# Patient Record
Sex: FEMALE | Race: BLACK OR AFRICAN AMERICAN | NOT HISPANIC OR LATINO
[De-identification: names, ages, dates, MRNs, and addresses within clinical notes are randomized per-mention and may not be internally consistent; named-entity substitution may affect disease eponyms.]

---

## 2017-01-19 ENCOUNTER — APPOINTMENT (OUTPATIENT)
Dept: PULMONOLOGY | Facility: CLINIC | Age: 57
End: 2017-01-19

## 2017-01-19 VITALS
HEART RATE: 109 BPM | BODY MASS INDEX: 32.25 KG/M2 | OXYGEN SATURATION: 96 % | WEIGHT: 160 LBS | HEIGHT: 59 IN | SYSTOLIC BLOOD PRESSURE: 130 MMHG | TEMPERATURE: 97.4 F | DIASTOLIC BLOOD PRESSURE: 80 MMHG

## 2017-01-19 DIAGNOSIS — Z87.891 PERSONAL HISTORY OF NICOTINE DEPENDENCE: ICD-10-CM

## 2017-01-19 DIAGNOSIS — F17.200 NICOTINE DEPENDENCE, UNSPECIFIED, UNCOMPLICATED: ICD-10-CM

## 2017-01-19 DIAGNOSIS — F17.210 NICOTINE DEPENDENCE, CIGARETTES, UNCOMPLICATED: ICD-10-CM

## 2017-01-19 DIAGNOSIS — R05 COUGH: ICD-10-CM

## 2017-01-19 DIAGNOSIS — E04.2 NONTOXIC MULTINODULAR GOITER: ICD-10-CM

## 2017-01-20 PROBLEM — R05 CHRONIC COUGH: Status: ACTIVE | Noted: 2017-01-20

## 2017-01-21 PROBLEM — E04.2 MULTIPLE THYROID NODULES: Status: RESOLVED | Noted: 2017-01-21 | Resolved: 2017-01-21

## 2017-01-21 PROBLEM — Z87.891 FORMER SMOKER: Status: ACTIVE | Noted: 2017-01-21

## 2017-01-21 PROBLEM — F17.200 HEAVY TOBACCO SMOKER: Status: ACTIVE | Noted: 2017-01-21

## 2017-04-26 ENCOUNTER — APPOINTMENT (OUTPATIENT)
Dept: ENDOCRINOLOGY | Facility: CLINIC | Age: 57
End: 2017-04-26

## 2018-03-15 ENCOUNTER — APPOINTMENT (OUTPATIENT)
Dept: ENDOCRINOLOGY | Facility: CLINIC | Age: 58
End: 2018-03-15

## 2018-04-24 ENCOUNTER — APPOINTMENT (OUTPATIENT)
Dept: ENDOCRINOLOGY | Facility: CLINIC | Age: 58
End: 2018-04-24
Payer: MEDICAID

## 2018-04-24 VITALS — HEART RATE: 100 BPM | DIASTOLIC BLOOD PRESSURE: 70 MMHG | SYSTOLIC BLOOD PRESSURE: 120 MMHG

## 2018-04-24 VITALS
WEIGHT: 177 LBS | DIASTOLIC BLOOD PRESSURE: 77 MMHG | HEIGHT: 59 IN | SYSTOLIC BLOOD PRESSURE: 120 MMHG | HEART RATE: 100 BPM | BODY MASS INDEX: 35.68 KG/M2

## 2018-04-24 VITALS — WEIGHT: 177 LBS | HEIGHT: 59 IN | BODY MASS INDEX: 35.68 KG/M2

## 2018-04-24 DIAGNOSIS — Z87.891 PERSONAL HISTORY OF NICOTINE DEPENDENCE: ICD-10-CM

## 2018-04-24 PROCEDURE — 99406 BEHAV CHNG SMOKING 3-10 MIN: CPT

## 2018-04-24 PROCEDURE — 99215 OFFICE O/P EST HI 40 MIN: CPT | Mod: 25

## 2018-04-24 RX ORDER — IBUPROFEN 800 MG/1
800 TABLET, FILM COATED ORAL
Refills: 0 | Status: ACTIVE | COMMUNITY

## 2018-04-24 RX ORDER — FAMOTIDINE 20 MG/1
20 TABLET, FILM COATED ORAL
Refills: 0 | Status: ACTIVE | COMMUNITY

## 2018-05-04 LAB
25(OH)D3 SERPL-MCNC: 20.4 NG/ML
ACTH SER-ACNC: 11 PG/ML
ALBUMIN SERPL ELPH-MCNC: 4.5 G/DL
ALP BLD-CCNC: 126 U/L
ALT SERPL-CCNC: 39 U/L
ANION GAP SERPL CALC-SCNC: 13 MMOL/L
AST SERPL-CCNC: 30 U/L
BILIRUB SERPL-MCNC: 0.2 MG/DL
BUN SERPL-MCNC: 10 MG/DL
CALCIUM SERPL-MCNC: 10.2 MG/DL
CHLORIDE SERPL-SCNC: 99 MMOL/L
CHOLEST SERPL-MCNC: 268 MG/DL
CHOLEST/HDLC SERPL: 5.4 RATIO
CO2 SERPL-SCNC: 30 MMOL/L
CORTIS SERPL-MCNC: 5.2 UG/DL
CREAT SERPL-MCNC: 1.04 MG/DL
CREAT SPEC-SCNC: 154 MG/DL
FOLATE SERPL-MCNC: 16.1 NG/ML
GLUCOSE SERPL-MCNC: 104 MG/DL
HBA1C MFR BLD HPLC: 7.7 %
HDLC SERPL-MCNC: 50 MG/DL
IGF-1 INTERP: NORMAL
IGF-I BLD-MCNC: 189 NG/ML
LDLC SERPL CALC-MCNC: 178 MG/DL
MICROALBUMIN 24H UR DL<=1MG/L-MCNC: 2.5 MG/DL
MICROALBUMIN/CREAT 24H UR-RTO: 16 MG/G
POTASSIUM SERPL-SCNC: 4.6 MMOL/L
PROT SERPL-MCNC: 8 G/DL
SODIUM SERPL-SCNC: 142 MMOL/L
T3 SERPL-MCNC: 92 NG/DL
T4 FREE SERPL-MCNC: 1 NG/DL
TRIGL SERPL-MCNC: 201 MG/DL
TSH SERPL-ACNC: 0.67 UIU/ML
VIT B12 SERPL-MCNC: 718 PG/ML

## 2018-05-08 ENCOUNTER — APPOINTMENT (OUTPATIENT)
Dept: ENDOCRINOLOGY | Facility: CLINIC | Age: 58
End: 2018-05-08

## 2018-06-20 ENCOUNTER — APPOINTMENT (OUTPATIENT)
Dept: ENDOCRINOLOGY | Facility: CLINIC | Age: 58
End: 2018-06-20
Payer: MEDICAID

## 2018-06-20 ENCOUNTER — APPOINTMENT (OUTPATIENT)
Dept: ENDOCRINOLOGY | Facility: CLINIC | Age: 58
End: 2018-06-20

## 2018-06-20 VITALS
WEIGHT: 171.6 LBS | SYSTOLIC BLOOD PRESSURE: 140 MMHG | HEIGHT: 59 IN | HEART RATE: 116 BPM | BODY MASS INDEX: 34.6 KG/M2 | DIASTOLIC BLOOD PRESSURE: 83 MMHG

## 2018-06-20 DIAGNOSIS — E78.5 HYPERLIPIDEMIA, UNSPECIFIED: ICD-10-CM

## 2018-06-20 DIAGNOSIS — E66.9 OBESITY, UNSPECIFIED: ICD-10-CM

## 2018-06-20 DIAGNOSIS — E11.9 TYPE 2 DIABETES MELLITUS W/OUT COMPLICATIONS: ICD-10-CM

## 2018-06-20 PROCEDURE — 99214 OFFICE O/P EST MOD 30 MIN: CPT

## 2018-08-22 ENCOUNTER — APPOINTMENT (OUTPATIENT)
Dept: ENDOCRINOLOGY | Facility: CLINIC | Age: 58
End: 2018-08-22
Payer: MEDICAID

## 2018-08-22 VITALS
BODY MASS INDEX: 33.87 KG/M2 | DIASTOLIC BLOOD PRESSURE: 106 MMHG | WEIGHT: 168 LBS | SYSTOLIC BLOOD PRESSURE: 143 MMHG | HEIGHT: 59 IN | HEART RATE: 96 BPM

## 2018-08-22 LAB
GLUCOSE BLDC GLUCOMTR-MCNC: 76
HBA1C MFR BLD HPLC: 7.4

## 2018-08-22 PROCEDURE — 99214 OFFICE O/P EST MOD 30 MIN: CPT

## 2018-08-22 RX ORDER — LOSARTAN POTASSIUM 25 MG/1
25 TABLET, FILM COATED ORAL DAILY
Qty: 90 | Refills: 2 | Status: ACTIVE | COMMUNITY
Start: 2018-08-22 | End: 1900-01-01

## 2018-08-22 RX ORDER — EMPAGLIFLOZIN 25 MG/1
25 TABLET, FILM COATED ORAL
Qty: 90 | Refills: 3 | Status: ACTIVE | COMMUNITY
Start: 2018-04-24 | End: 1900-01-01

## 2018-08-22 RX ORDER — METFORMIN ER 500 MG 500 MG/1
500 TABLET ORAL DAILY
Qty: 180 | Refills: 3 | Status: ACTIVE | COMMUNITY
Start: 2018-04-24 | End: 1900-01-01

## 2018-08-22 RX ORDER — VALSARTAN 40 MG/1
TABLET ORAL
Refills: 0 | Status: COMPLETED | COMMUNITY
End: 2018-08-22

## 2018-12-19 ENCOUNTER — APPOINTMENT (OUTPATIENT)
Dept: ENDOCRINOLOGY | Facility: CLINIC | Age: 58
End: 2018-12-19

## 2019-02-11 ENCOUNTER — APPOINTMENT (OUTPATIENT)
Dept: ENDOCRINOLOGY | Facility: CLINIC | Age: 59
End: 2019-02-11

## 2019-04-04 ENCOUNTER — APPOINTMENT (OUTPATIENT)
Dept: ENDOCRINOLOGY | Facility: CLINIC | Age: 59
End: 2019-04-04

## 2019-04-05 ENCOUNTER — APPOINTMENT (OUTPATIENT)
Dept: ENDOCRINOLOGY | Facility: CLINIC | Age: 59
End: 2019-04-05

## 2020-03-02 ENCOUNTER — FORM ENCOUNTER (OUTPATIENT)
Age: 60
End: 2020-03-02

## 2020-03-03 ENCOUNTER — APPOINTMENT (OUTPATIENT)
Dept: PULMONOLOGY | Facility: CLINIC | Age: 60
End: 2020-03-03
Payer: MEDICAID

## 2020-03-03 ENCOUNTER — OUTPATIENT (OUTPATIENT)
Dept: OUTPATIENT SERVICES | Facility: HOSPITAL | Age: 60
LOS: 1 days | End: 2020-03-03
Payer: MEDICAID

## 2020-03-03 ENCOUNTER — APPOINTMENT (OUTPATIENT)
Dept: CT IMAGING | Facility: HOSPITAL | Age: 60
End: 2020-03-03
Payer: MEDICAID

## 2020-03-03 VITALS
HEART RATE: 116 BPM | WEIGHT: 160 LBS | BODY MASS INDEX: 32.25 KG/M2 | OXYGEN SATURATION: 95 % | HEIGHT: 59 IN | DIASTOLIC BLOOD PRESSURE: 84 MMHG | SYSTOLIC BLOOD PRESSURE: 132 MMHG | TEMPERATURE: 98.1 F

## 2020-03-03 PROCEDURE — G0297: CPT

## 2020-03-03 PROCEDURE — 99406 BEHAV CHNG SMOKING 3-10 MIN: CPT | Mod: 25

## 2020-03-03 PROCEDURE — G0296 VISIT TO DETERM LDCT ELIG: CPT

## 2020-03-03 PROCEDURE — G0297: CPT | Mod: 26

## 2020-03-03 NOTE — PLAN
[Smoking Cessation Guidance Provided] : Smoking cessation guidance was provided to patient [Smoking Cessation Pamphlet Given] : smoking cessation pamphlet given to patient  [[facility]] : referred to [facility] [phone] [Smoking Cessation] : smoking cessation [FreeTextEntry1] : Plan:\par -Low Dose CT chest for lung cancer screening\par -Follow up with patient and her referring provider after her LDCT results have been reviewed by the multi-disciplinary clinical team\par -Encouraged smoking cessation\par -_encouraged to try continued decrease cig/day\par -[place] Sm[place] Quit[place] literature shared with patient and Staying Smoke Free brochure\par -Referred to CTC\par \par \par Engaged in shared decision making with Ms. NISHANT GARCIA . Answered all questions. She verbalized understanding and agreement. She knows to call back with any questions or concerns

## 2020-03-03 NOTE — HISTORY OF PRESENT ILLNESS
[_____ pack-years] : [unfilled] pack-years [Current] : current smoker [TextBox_13] : Referred by Dr. Mcdermott\par \par Ms. NISHANT GARCIA  is a 59 year old woman with a history of DM, high cholesterol\par \par She  was seen in the office today for review of eligibility for, as well as, discussion of Low-Dose CT lung cancer screening program. Reviewed and confirmed that the patient meets screening eligibility criteria:\par -Age: 59 year \par Smoking status:\par -Current smoker of 5 cig/day\par -Number of pack(s) per day: 1\par -Number of year smoked:  44\par -Number of pack years smokin\par \par Has tried NRT patch, thinks she tried Chantix "but my mind was not made up" Has only "slowed down because I was sick" Has been smoking less since  in hospital since he was the one who bought the cigarettes\par Does not feel she is quite ready to quit\par \par Ms. GARCIA denies any signs or symptoms of lung cancer including new cough, change in cough, hemoptysis and unintentional weight loss.  \par \par Ms. GARCIA denies any personal history of lung cancer. No lung cancer in a 1st degree relative. Denies any history of lung disease. Denies any history of occupational exposures.  [TextBox_6] : 1 [TextBox_8] : 44

## 2020-03-03 NOTE — ASSESSMENT
[Discussed Risks and Advised to Quit Smoking] : Discussed risks and advised to quit smoking [Discussed Cessation Medication] : cessation medication was discussed [Discussed Cessation Strategies] : cessation strategies were discussed [Smoking Cessation Counseling Given (more than 3 min less than10 min) and Resources Provided] : Smoking cessation counseling given (more than 3 min less than 10 min) and resources provided [Not Ready] : Patient is not ready for cessation intervention [Pre-contemplation] : Pre-contemplation: The patient is not thinking about quitting smoking in the next 6 months [de-identified] : Acknowledged how difficult it is to quit smoking. Advised that quitting smoking is the most important thing a person can do for their health. Discussed strategies to deal with cravings. Suggested keeping a log of cigarettes and their triggers in an effort to identify triggers and break routines/habits. Suggested waiting 15 minutes after initial craving. Suggested making a rule that she can't smoke in her home

## 2020-06-14 ENCOUNTER — RX RENEWAL (OUTPATIENT)
Age: 60
End: 2020-06-14

## 2021-02-09 ENCOUNTER — EMERGENCY (EMERGENCY)
Facility: HOSPITAL | Age: 61
LOS: 1 days | Discharge: HOME | End: 2021-02-09
Attending: EMERGENCY MEDICINE | Admitting: EMERGENCY MEDICINE
Payer: MEDICAID

## 2021-02-09 VITALS
TEMPERATURE: 99 F | HEART RATE: 107 BPM | DIASTOLIC BLOOD PRESSURE: 62 MMHG | SYSTOLIC BLOOD PRESSURE: 101 MMHG | RESPIRATION RATE: 18 BRPM | OXYGEN SATURATION: 62 %

## 2021-02-09 VITALS
HEART RATE: 97 BPM | RESPIRATION RATE: 18 BRPM | WEIGHT: 149.91 LBS | OXYGEN SATURATION: 97 % | DIASTOLIC BLOOD PRESSURE: 79 MMHG | SYSTOLIC BLOOD PRESSURE: 114 MMHG | TEMPERATURE: 98 F | HEIGHT: 59 IN
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
## 2021-02-09 DIAGNOSIS — S09.90XA UNSPECIFIED INJURY OF HEAD, INITIAL ENCOUNTER: ICD-10-CM

## 2021-02-09 PROCEDURE — 73030 X-RAY EXAM OF SHOULDER: CPT

## 2021-02-09 PROCEDURE — 72100 X-RAY EXAM L-S SPINE 2/3 VWS: CPT | Mod: 26

## 2021-02-09 PROCEDURE — 70450 CT HEAD/BRAIN W/O DYE: CPT | Mod: 26,MA

## 2021-02-09 PROCEDURE — 99284 EMERGENCY DEPT VISIT MOD MDM: CPT

## 2021-02-09 PROCEDURE — 71046 X-RAY EXAM CHEST 2 VIEWS: CPT | Mod: 26

## 2021-02-09 PROCEDURE — 71046 X-RAY EXAM CHEST 2 VIEWS: CPT

## 2021-02-09 PROCEDURE — 70450 CT HEAD/BRAIN W/O DYE: CPT

## 2021-02-09 PROCEDURE — 72100 X-RAY EXAM L-S SPINE 2/3 VWS: CPT

## 2021-02-09 PROCEDURE — 99285 EMERGENCY DEPT VISIT HI MDM: CPT

## 2021-02-09 PROCEDURE — 72125 CT NECK SPINE W/O DYE: CPT | Mod: 26,MA

## 2021-02-09 PROCEDURE — 72125 CT NECK SPINE W/O DYE: CPT

## 2021-02-09 PROCEDURE — 73030 X-RAY EXAM OF SHOULDER: CPT | Mod: 26

## 2021-02-09 RX ORDER — IBUPROFEN 200 MG
1 TABLET ORAL
Qty: 12 | Refills: 0
Start: 2021-02-09

## 2021-02-09 RX ORDER — CYCLOBENZAPRINE HYDROCHLORIDE 10 MG/1
1 TABLET, FILM COATED ORAL
Qty: 12 | Refills: 0
Start: 2021-02-09

## 2021-02-09 RX ORDER — IBUPROFEN 200 MG
600 TABLET ORAL ONCE
Refills: 0 | Status: COMPLETED | OUTPATIENT
Start: 2021-02-09 | End: 2021-02-09

## 2021-02-09 RX ORDER — CYCLOBENZAPRINE HYDROCHLORIDE 10 MG/1
10 TABLET, FILM COATED ORAL ONCE
Refills: 0 | Status: COMPLETED | OUTPATIENT
Start: 2021-02-09 | End: 2021-02-09

## 2021-02-09 RX ADMIN — CYCLOBENZAPRINE HYDROCHLORIDE 10 MILLIGRAM(S): 10 TABLET, FILM COATED ORAL at 12:59

## 2021-02-09 RX ADMIN — Medication 600 MILLIGRAM(S): at 12:59

## 2021-02-09 NOTE — ED PROVIDER NOTE - PATIENT PORTAL LINK FT
You can access the FollowMyHealth Patient Portal offered by [facility] by registering at the following website: [email or web address]. By joining [employer]’s FollowMyHealth portal, you will also be able to view your health information using other applications (apps) compatible with our system.

## 2021-02-09 NOTE — ED PROVIDER NOTE - CONSTITUTIONAL, MLM
Well appearing, awake, alert, oriented to person, place, time/situation and in no apparent distress. normal... Well appearing, awake, alert, oriented and in no apparent distress.

## 2021-02-09 NOTE — ED PROVIDER NOTE - MUSCULOSKELETAL, MLM
Ambulates in the ER with no problems. No midline tenderness to palpation, neuro intact, full range of motion, mild tenderness to palpation to the left shoulder Ambulates in the ER with no problems. No midline spine tenderness to palpation, neuro intact, full range of motion, mild tenderness to palpation to the left shoulder with FROM

## 2021-02-09 NOTE — ED PROVIDER NOTE - CLINICAL SUMMARY MEDICAL DECISION MAKING FREE TEXT BOX
60F presents for an accidental slip and fall sustaining head pain, back pain and shoulder pain. Plan: Will order head CT, C-spine imaging, L-Spine imaging, CXR, L shoulder. Will administer flexeril and ibuprofen. 61 yo F presents for an accidental slip and fall down 4 steps sustaining head pain, back pain and shoulder pain. Plan: Will order head CT, C-spine imaging, L-Spine imaging, CXR, L shoulder XR. Will administer flexeril and ibuprofen.  ED course: VS noted. Neuro exam is normal. CT head and C-spine with NAD. XRs L-spine/ CXR and left shoulder with NAD. Pt feeling better post pain meds. Rxs given for Ibuprofen and Flexeril. To f/up outpt. Return precautions given.

## 2021-02-09 NOTE — ED ADULT NURSE NOTE - OBJECTIVE STATEMENT
61 y/o female BIBEMS for evaluation of bakc pain and headache s/p fall today. Pt reports she fell three steps and hit "My head and back" Pt reports LOC, endorses lightheadedness.

## 2021-02-09 NOTE — ED ADULT TRIAGE NOTE - OTHER COMPLAINTS
Reports fall down 4 steps. Patient reports pain down the back of her body. Positive head injury, denies use of blood thinners. Unknown if patient had loss of consciousness.

## 2021-02-09 NOTE — ED PROVIDER NOTE - NSFOLLOWUPINSTRUCTIONS_ED_ALL_ED_FT
Please follow up with your primary care doctor. Return to the ER if you develop any concerning symptoms.     Back Pain    Back pain is very common in adults. The cause of back pain is rarely dangerous and the pain often gets better over time. The cause of your back pain may not be known and may include strain of muscles or ligaments, degeneration of the spinal disks, or arthritis. Occasionally the pain may radiate down your leg(s). Over-the-counter medicines to reduce pain and inflammation are often the most helpful. Stretching and remaining active frequently helps the healing process.     SEEK IMMEDIATE MEDICAL CARE IF YOU HAVE ANY OF THE FOLLOWING SYMPTOMS: bowel or bladder control problems, unusual weakness or numbness in your arms or legs, nausea or vomiting, abdominal pain, fever, dizziness/lightheadedness.      Closed Head Injury    A closed head injury is an injury to your head that may or may not involve a traumatic brain injury (TBI). Symptoms of TBI can be short or long lasting and include headache, dizziness, interference with memory or speech, fatigue, confusion, changes in sleep, mood changes, nausea, depression/anxiety, and dulling of senses. Make sure to obtain proper rest which includes getting plenty of sleep, avoiding excessive visual stimulation, and avoiding activities that may cause physical or mental stress. Avoid any situation where there is potential for another head injury, including sports.    SEEK IMMEDIATE MEDICAL CARE IF YOU HAVE ANY OF THE FOLLOWING SYMPTOMS: unusual drowsiness, vomiting, severe dizziness, seizures, lightheadedness, muscular weakness, different pupil sizes, visual changes, or clear or bloody discharge from your ears or nose.

## 2021-02-09 NOTE — ED PROVIDER NOTE - OBJECTIVE STATEMENT
60F with a pmhx of NIDDM, HTN, HLD, crack cocaine abuse and herniated disk in neck and back [no surgery] presents to the ED  for head injury, left shoulder pain, neck pain and back pain secondary to a slip-and-fall. Patient fell down 4 steps as she was coming out of rehab for addiction due to slippery and wet floors. Patient fell and hit her back, left shoulder, and head on the steps. Patient Denies any LOC, CP, SOB, focal numbness, weakness or any other acute medical complaints at this time. Patient denies any anticoagulant use. Patient ambulated after the fall, as well as in the ED. 61 yo F with a pmhx of NIDDM, HTN, HLD, crack cocaine abuse and herniated disk in neck and back [no surgery] presents to the ED  for head injury, left shoulder pain, neck pain and back pain secondary to a slip-and-fall. Patient fell down 4 steps as she was coming out of rehab for cocaine addiction due to slippery and wet floors. Patient fell and hit her back, left shoulder, and head on the steps. Patient Denies any LOC, CP, SOB, focal numbness, weakness or any other acute medical complaints at this time. Patient denies any anticoagulant use. Patient ambulated after the fall, as well as in the ED.

## 2021-02-09 NOTE — ED PROVIDER NOTE - ENMT, MLM
HEAD: no swelling, no lac, no hematoma. Airway is patent, Nasal mucosa clear. Mouth with normal mucosa. Throat has no vesicles, no oropharyngeal exudates and uvula is midline.

## 2021-02-09 NOTE — ED PROVIDER NOTE - NSFOLLOWUPCLINICS_GEN_ALL_ED_FT
[facility]  Family Medicine  [address]  [place], [place] [address]  Phone: [phone]  Fax:   Follow Up Time: 4-6 Days

## 2021-02-09 NOTE — ED PROVIDER NOTE - NEUROLOGICAL, MLM
Alert and oriented, no focal deficits, no motor or sensory deficits. Alert and oriented, no focal deficits, no motor deficits. Gait is normal.

## 2021-03-12 PROBLEM — F19.10 OTHER PSYCHOACTIVE SUBSTANCE ABUSE, UNCOMPLICATED: Chronic | Status: ACTIVE | Noted: 2021-02-13

## 2021-03-12 PROBLEM — I10 ESSENTIAL (PRIMARY) HYPERTENSION: Chronic | Status: ACTIVE | Noted: 2021-02-13

## 2021-03-12 PROBLEM — E78.5 HYPERLIPIDEMIA, UNSPECIFIED: Chronic | Status: ACTIVE | Noted: 2021-02-13

## 2021-03-12 PROBLEM — E11.9 TYPE 2 DIABETES MELLITUS WITHOUT COMPLICATIONS: Chronic | Status: ACTIVE | Noted: 2021-02-13

## 2021-03-19 ENCOUNTER — APPOINTMENT (OUTPATIENT)
Dept: PULMONOLOGY | Facility: CLINIC | Age: 61
End: 2021-03-19
Payer: MEDICAID

## 2021-03-19 VITALS — HEIGHT: 59 IN | WEIGHT: 145 LBS | BODY MASS INDEX: 29.23 KG/M2

## 2021-03-19 DIAGNOSIS — F17.210 NICOTINE DEPENDENCE, CIGARETTES, UNCOMPLICATED: ICD-10-CM

## 2021-03-19 PROCEDURE — G0296 VISIT TO DETERM LDCT ELIG: CPT

## 2021-03-19 PROCEDURE — 99072 ADDL SUPL MATRL&STAF TM PHE: CPT

## 2021-03-19 NOTE — HISTORY OF PRESENT ILLNESS
[Current] : current smoker [_____ pack-years] : [unfilled] pack-years [TextBox_13] : Referred by Dr. Corea\par \par Ms. NISHANT GARCIA is a 60 year old woman with a history of DM, high cholesterol\par \par Over the phone today we reviewed eligibility for, as well as, discussion of Low-Dose CT lung cancer screening program. Reviewed and confirmed that the patient meets screening eligibility criteria:\par -Age: 60 year \par Smoking status:\par -Current smoker of 3 cig/day\par -Number of pack(s) per day: 1\par -Number of year smoked: 45\par -Number of pack years smokin\par \par Has tried NRT patch, thinks she tried Chantix "but my mind was not made up" Has slowed down \par Does not feel she is quite ready to quit. Declines CTC because does not want to waste their time\par \par Ms. GARCIA denies any signs or symptoms of lung cancer including new cough, change in cough, hemoptysis and unintentional weight loss. \par \par Ms. GARCIA denies any personal history of lung cancer. No lung cancer in a 1st degree relative. Denies any history of lung disease. Denies any history of occupational exposures. \par \par  [TextBox_6] : 1 [TextBox_8] : 44

## 2021-03-19 NOTE — REASON FOR VISIT
[Home] : at home, [unfilled] , at the time of the visit. [Medical Office: ([facility])___] : at the medical office located in  [Verbal consent obtained from patient] : the patient, [unfilled] [Annual Follow-Up] : an annual follow-up visit [Review of Eligibility] : review of eligibility [Low-Dose CT Screening Discussion] : low-dose CT lung cancer screening discussion

## 2021-03-19 NOTE — ASSESSMENT
[Discussed Risks and Advised to Quit Smoking] : Discussed risks and advised to quit smoking [Not Ready] : Patient is not ready for cessation intervention [Pre-contemplation] : Pre-contemplation: The patient is not thinking about quitting smoking in the next 6 months [Patient refused treatment] : Patient refused treatment

## 2021-03-19 NOTE — PLAN
[Smoking Cessation] : smoking cessation [FreeTextEntry1] : Plan:\par -Low Dose CT chest for lung cancer screening\par -Follow up with patient and her referring provider after her LDCT results have been reviewed by the multi-disciplinary clinical team.\par -Encouraged smoking cessation\par -Referral to CTC declined\par \par Engaged in shared decision making with Ms. NISHANT JOSE . Answered all questions. She verbalized understanding and agreement. She knows to call back with any questions or concerns

## 2021-03-19 NOTE — DATA REVIEWED
[Lung Cancer Screening] : Patient underwent lung cancer screening [2] : 2 [TextBox_12] : 3/20 [TextBox_52] : 1

## 2021-04-21 ENCOUNTER — APPOINTMENT (OUTPATIENT)
Dept: CT IMAGING | Facility: HOSPITAL | Age: 61
End: 2021-04-21

## 2021-04-23 ENCOUNTER — RESULT REVIEW (OUTPATIENT)
Age: 61
End: 2021-04-23

## 2021-04-23 ENCOUNTER — APPOINTMENT (OUTPATIENT)
Dept: CT IMAGING | Facility: HOSPITAL | Age: 61
End: 2021-04-23

## 2021-04-23 ENCOUNTER — OUTPATIENT (OUTPATIENT)
Dept: OUTPATIENT SERVICES | Facility: HOSPITAL | Age: 61
LOS: 1 days | End: 2021-04-23
Payer: MEDICAID

## 2021-04-23 PROCEDURE — 71271 CT THORAX LUNG CANCER SCR C-: CPT | Mod: 26

## 2021-04-23 PROCEDURE — 71271 CT THORAX LUNG CANCER SCR C-: CPT

## 2021-09-19 ENCOUNTER — EMERGENCY (EMERGENCY)
Facility: HOSPITAL | Age: 61
LOS: 1 days | Discharge: HOME | End: 2021-09-19
Admitting: EMERGENCY MEDICINE
Payer: MEDICAID

## 2021-09-19 VITALS
SYSTOLIC BLOOD PRESSURE: 133 MMHG | TEMPERATURE: 98 F | HEART RATE: 86 BPM | RESPIRATION RATE: 18 BRPM | HEIGHT: 59 IN | DIASTOLIC BLOOD PRESSURE: 89 MMHG | OXYGEN SATURATION: 97 % | WEIGHT: 145.95 LBS

## 2021-09-19 DIAGNOSIS — E78.5 HYPERLIPIDEMIA, UNSPECIFIED: ICD-10-CM

## 2021-09-19 DIAGNOSIS — I10 ESSENTIAL (PRIMARY) HYPERTENSION: ICD-10-CM

## 2021-09-19 DIAGNOSIS — M25.511 PAIN IN RIGHT SHOULDER: ICD-10-CM
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
## 2021-09-19 DIAGNOSIS — E11.9 TYPE 2 DIABETES MELLITUS WITHOUT COMPLICATIONS: ICD-10-CM
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
## 2021-09-19 PROCEDURE — 73030 X-RAY EXAM OF SHOULDER: CPT | Mod: 26,RT

## 2021-09-19 PROCEDURE — 99283 EMERGENCY DEPT VISIT LOW MDM: CPT | Mod: 25

## 2021-09-19 PROCEDURE — 73030 X-RAY EXAM OF SHOULDER: CPT

## 2021-09-19 PROCEDURE — 99284 EMERGENCY DEPT VISIT MOD MDM: CPT | Mod: 25

## 2021-09-19 RX ORDER — IBUPROFEN 200 MG
800 TABLET ORAL ONCE
Refills: 0 | Status: COMPLETED | OUTPATIENT
Start: 2021-09-19 | End: 2021-09-19

## 2021-09-19 RX ADMIN — Medication 800 MILLIGRAM(S): at 14:01

## 2021-09-19 RX ADMIN — Medication 800 MILLIGRAM(S): at 13:41
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
## 2021-09-19 NOTE — ED PROVIDER NOTE - PHYSICAL EXAMINATION
CONSTITUTIONAL: Well-appearing;  in no apparent distress.   HEAD: Normocephalic; atraumatic.   EYES: PERRL; EOM intact; conjunctiva and sclera clear  ENT: normal nose; no rhinorrhea; normal pharynx with no erythema or lesions.   NECK: Supple; non-tender;   CARDIOVASCULAR: rrr, no audible murmurs   RESPIRATORY: Breathing easily;   MSK: R shoulder-   EXT: No cyanosis or edema; N/V intact  SKIN: Normal for age and race; warm; dry; good turgor; no apparent lesions or rash.
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
## 2021-09-19 NOTE — ED PROVIDER NOTE - PATIENT PORTAL LINK FT
You can access the FollowMyHealth Patient Portal offered by [facility] by registering at the following website: [email or web address]. By joining [employer]’s FollowMyHealth portal, you will also be able to view your health information using other applications (apps) compatible with our system.
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
## 2022-04-29 ENCOUNTER — APPOINTMENT (OUTPATIENT)
Dept: PULMONOLOGY | Facility: CLINIC | Age: 62
End: 2022-04-29
Payer: MEDICAID

## 2022-04-29 VITALS
HEART RATE: 115 BPM | SYSTOLIC BLOOD PRESSURE: 131 MMHG | DIASTOLIC BLOOD PRESSURE: 78 MMHG | WEIGHT: 150 LBS | OXYGEN SATURATION: 98 % | BODY MASS INDEX: 30.24 KG/M2 | HEIGHT: 59 IN | TEMPERATURE: 98.1 F

## 2022-04-29 PROCEDURE — G0008: CPT

## 2022-04-29 PROCEDURE — 94010 BREATHING CAPACITY TEST: CPT

## 2022-04-29 PROCEDURE — 90661 CCIIV3 VAC ABX FR 0.5 ML IM: CPT

## 2022-04-29 PROCEDURE — 99214 OFFICE O/P EST MOD 30 MIN: CPT | Mod: 25

## 2022-05-01 NOTE — DISCUSSION/SUMMARY
[FreeTextEntry1] : she was shocked that she has copd\par \par i asked why would she be shocked,  she is smoker\par \par i emphazied the need to stop smoking\par \par will get screened

## 2022-05-01 NOTE — PROCEDURE
[FreeTextEntry1] : sp;irometry shows copd reports almost anuric few drops of urine, HD T/ TH/ Sat  left AV fistula

## 2022-05-01 NOTE — HISTORY OF PRESENT ILLNESS
[TextBox_4] : smoking, very nonchalant attitude about the habit,  does not note sob,\par \par has continued to get screened.\par \par no other major medicai issues she says but charts\par shows htn, pre diabetes, arthritis

## 2022-11-07 ENCOUNTER — APPOINTMENT (OUTPATIENT)
Dept: PULMONOLOGY | Facility: CLINIC | Age: 62
End: 2022-11-07

## 2022-11-15 ENCOUNTER — EMERGENCY (EMERGENCY)
Facility: HOSPITAL | Age: 62
LOS: 1 days | Discharge: HOME | End: 2022-11-15
Attending: EMERGENCY MEDICINE | Admitting: EMERGENCY MEDICINE
Payer: MEDICAID

## 2022-11-15 VITALS
OXYGEN SATURATION: 99 % | HEART RATE: 102 BPM | WEIGHT: 153 LBS | DIASTOLIC BLOOD PRESSURE: 84 MMHG | TEMPERATURE: 99 F | SYSTOLIC BLOOD PRESSURE: 128 MMHG | HEIGHT: 59 IN | RESPIRATION RATE: 20 BRPM

## 2022-11-15 VITALS
TEMPERATURE: 98 F | DIASTOLIC BLOOD PRESSURE: 79 MMHG | SYSTOLIC BLOOD PRESSURE: 117 MMHG | HEART RATE: 100 BPM | RESPIRATION RATE: 18 BRPM | OXYGEN SATURATION: 95 %

## 2022-11-15 LAB — SARS-COV-2 RNA SPEC QL NAA+PROBE: NEGATIVE — SIGNIFICANT CHANGE UP

## 2022-11-15 PROCEDURE — 99285 EMERGENCY DEPT VISIT HI MDM: CPT

## 2022-11-15 PROCEDURE — 87635 SARS-COV-2 COVID-19 AMP PRB: CPT

## 2022-11-15 PROCEDURE — 71046 X-RAY EXAM CHEST 2 VIEWS: CPT

## 2022-11-15 PROCEDURE — 94640 AIRWAY INHALATION TREATMENT: CPT

## 2022-11-15 PROCEDURE — 99284 EMERGENCY DEPT VISIT MOD MDM: CPT | Mod: 25

## 2022-11-15 PROCEDURE — 71046 X-RAY EXAM CHEST 2 VIEWS: CPT | Mod: 26

## 2022-11-15 RX ORDER — IPRATROPIUM/ALBUTEROL SULFATE 18-103MCG
3 AEROSOL WITH ADAPTER (GRAM) INHALATION ONCE
Refills: 0 | Status: COMPLETED | OUTPATIENT
Start: 2022-11-15 | End: 2022-11-15

## 2022-11-15 RX ORDER — ALBUTEROL 90 UG/1
2 AEROSOL, METERED ORAL
Qty: 1 | Refills: 0
Start: 2022-11-15 | End: 2022-11-19

## 2022-11-15 RX ADMIN — Medication 3 MILLILITER(S): at 09:37

## 2022-11-15 RX ADMIN — Medication 3 MILLILITER(S): at 09:10

## 2022-11-15 RX ADMIN — Medication 60 MILLIGRAM(S): at 09:09

## 2022-11-15 NOTE — ED ADULT NURSE NOTE - OBJECTIVE STATEMENT
Patient presents to the ED complaining of cough, shortness of breath and right rib pain since sunday.

## 2022-11-15 NOTE — ED PROVIDER NOTE - MUSCULOSKELETAL, MLM
All bony prominences palpated and nontender.  Range of motion is not limited, no muscle or joint tenderness.  All soft tissue compartments palpated, normal, nontender without tension.  Bilateral radial pulses are normal, 2+ and symmetrical.  Bilateral Femoral/DP/PT pulses are normal 2+, and symmetrical.  No lower extremity edema or calf tenderness appreciated.  Negative Brenna's sign bilaterally.

## 2022-11-15 NOTE — ED PROVIDER NOTE - OBJECTIVE STATEMENT
63 yo F smoker w hx of asthma (never intubated or admitted) p/w several days of cough, yellow-clear phlegm, pleuritic bilateral chest pains, sharp, nonradiating, with wheezing and chills.  Denies LE edema, exertional chest pain, sob, fever, weakness or lightheadedness.

## 2022-11-15 NOTE — ED PROVIDER NOTE - NSFOLLOWUPINSTRUCTIONS_ED_ALL_ED_FT
Asthma    Asthma is a condition in which the airways tighten and narrow, making it difficult to breath. Asthma episodes, also called asthma attacks, range from minor to life-threatening. Symptoms include wheezing, coughing, chest tightness, or shortness of breath. The diagnosis of asthma is made by a review of your medical history and a physical exam, but may involve additional testing. Asthma cannot be cured, but medicines and lifestyle changes can help control it. Avoid triggers of asthma which may include animal dander, pollen, mold, smoke, air pollutants, etc.     SEEK IMMEDIATE MEDICAL CARE IF YOU HAVE ANY OF THE FOLLOWING SYMPTOMS: worsening of symptoms, shortness of breath at rest, chest pain, bluish discoloration to lips or fingertips, lightheadedness/dizziness, or fever.     FOLLOW UP WITH YOUR PRIMARY DOCTOR.

## 2022-11-15 NOTE — ED PROVIDER NOTE - CLINICAL SUMMARY MEDICAL DECISION MAKING FREE TEXT BOX
SS noted above concerning for acute bronchitis vs reactive airway disease exacerbation vs less likely pna.  DO not suspect PE, ACS, dissection or pneumothorax given context.  Plan CXR, nebs, steroids and reassess.

## 2022-11-15 NOTE — ED ADULT TRIAGE NOTE - CHIEF COMPLAINT QUOTE
Pt c/o cough/congestion and wheezing since Sunday. No fever/chills reported. Pt reports hx of asthma, using inhalers and states she ran out of her neb.

## 2022-11-15 NOTE — ED PROVIDER NOTE - PATIENT PORTAL LINK FT
You can access the FollowMyHealth Patient Portal offered by [facility] by registering at the following website: [email or web address]. By joining [employer]’s FollowMyHealth portal, you will also be able to view your health information using other applications (apps) compatible with our system.

## 2022-11-17 DIAGNOSIS — Z20.822 CONTACT WITH AND (SUSPECTED) EXPOSURE TO COVID-19: ICD-10-CM

## 2022-11-17 DIAGNOSIS — R05.9 COUGH, UNSPECIFIED: ICD-10-CM

## 2022-11-17 DIAGNOSIS — J45.901 UNSPECIFIED ASTHMA WITH (ACUTE) EXACERBATION: ICD-10-CM

## 2022-11-17 DIAGNOSIS — F17.200 NICOTINE DEPENDENCE, UNSPECIFIED, UNCOMPLICATED: ICD-10-CM

## 2022-12-15 ENCOUNTER — EMERGENCY (EMERGENCY)
Facility: HOSPITAL | Age: 62
LOS: 1 days | Discharge: HOME | End: 2022-12-15
Attending: STUDENT IN AN ORGANIZED HEALTH CARE EDUCATION/TRAINING PROGRAM | Admitting: STUDENT IN AN ORGANIZED HEALTH CARE EDUCATION/TRAINING PROGRAM
Payer: MEDICAID

## 2022-12-15 VITALS
WEIGHT: 151.9 LBS | SYSTOLIC BLOOD PRESSURE: 138 MMHG | DIASTOLIC BLOOD PRESSURE: 88 MMHG | RESPIRATION RATE: 17 BRPM | HEIGHT: 59 IN | TEMPERATURE: 98 F | HEART RATE: 98 BPM | OXYGEN SATURATION: 97 %

## 2022-12-15 DIAGNOSIS — Z76.0 ENCOUNTER FOR ISSUE OF REPEAT PRESCRIPTION: ICD-10-CM

## 2022-12-15 DIAGNOSIS — J45.901 UNSPECIFIED ASTHMA WITH (ACUTE) EXACERBATION: ICD-10-CM

## 2022-12-15 DIAGNOSIS — I10 ESSENTIAL (PRIMARY) HYPERTENSION: ICD-10-CM

## 2022-12-15 DIAGNOSIS — E78.5 HYPERLIPIDEMIA, UNSPECIFIED: ICD-10-CM

## 2022-12-15 DIAGNOSIS — Z20.822 CONTACT WITH AND (SUSPECTED) EXPOSURE TO COVID-19: ICD-10-CM

## 2022-12-15 DIAGNOSIS — E11.9 TYPE 2 DIABETES MELLITUS WITHOUT COMPLICATIONS: ICD-10-CM

## 2022-12-15 DIAGNOSIS — R05.1 ACUTE COUGH: ICD-10-CM

## 2022-12-15 DIAGNOSIS — Z87.891 PERSONAL HISTORY OF NICOTINE DEPENDENCE: ICD-10-CM

## 2022-12-15 LAB
RAPID RVP RESULT: SIGNIFICANT CHANGE UP
SARS-COV-2 RNA SPEC QL NAA+PROBE: SIGNIFICANT CHANGE UP

## 2022-12-15 PROCEDURE — 99283 EMERGENCY DEPT VISIT LOW MDM: CPT | Mod: 25

## 2022-12-15 PROCEDURE — 0225U NFCT DS DNA&RNA 21 SARSCOV2: CPT

## 2022-12-15 PROCEDURE — 71045 X-RAY EXAM CHEST 1 VIEW: CPT

## 2022-12-15 PROCEDURE — 99285 EMERGENCY DEPT VISIT HI MDM: CPT

## 2022-12-15 PROCEDURE — 71045 X-RAY EXAM CHEST 1 VIEW: CPT | Mod: 26

## 2022-12-15 PROCEDURE — 94640 AIRWAY INHALATION TREATMENT: CPT

## 2022-12-15 RX ORDER — IPRATROPIUM/ALBUTEROL SULFATE 18-103MCG
3 AEROSOL WITH ADAPTER (GRAM) INHALATION ONCE
Refills: 0 | Status: COMPLETED | OUTPATIENT
Start: 2022-12-15 | End: 2022-12-15

## 2022-12-15 RX ORDER — MINERAL OIL, PETROLATUM, PHENYLEPHRINE HCL 2.5; 140; 749 MG/G; MG/G; MG/G
1 OINTMENT TOPICAL
Qty: 60 | Refills: 0
Start: 2022-12-15

## 2022-12-15 RX ADMIN — Medication 3 MILLILITER(S): at 18:28

## 2022-12-15 RX ADMIN — Medication 100 MILLIGRAM(S): at 19:58

## 2022-12-15 NOTE — ED PROVIDER NOTE - CLINICAL SUMMARY MEDICAL DECISION MAKING FREE TEXT BOX
63 y/o F pt presents to ED with possible asthma triggered by environmental exposure. Will perform CXR r/o underlying pneumonia, viral syndrome also likely. Pt well appearing, no shortness of breath, no oxygen requirement, will give Duoneb, and reassess. Pt also requesting refill of her hemorrhoid prescription. WELLS low risk for PE.

## 2022-12-15 NOTE — ED PROVIDER NOTE - NS ED ROS FT
CONSTITUTIONAL: No fever, no chills, no fatigue  EYES: No eye redness, no visual changes  ENT: No ear pain, no sore throat  CARDIOVASCULAR: No chest pain, no palpitations  RESPIRATORY: Cough. no SOB  GI: No abdominal pain, no nausea, no vomiting, no constipation, no diarrhea  GENITOURINARY: No dysuria, no frequency, no hematuria  MUSCULOSKELETAL: No backpain, no joint pain, no myalgias  SKIN: No rash, no peripheral edema  NEURO: No headache, no confusion    ALL OTHER SYSTEMS NEGATIVE.

## 2022-12-15 NOTE — ED ADULT NURSE NOTE - NSIMPLEMENTINTERV_GEN_ALL_ED
Implemented All Universal Safety Interventions:  [place] to call system. Call bell, personal items and telephone within reach. Instruct patient to call for assistance. Room bathroom lighting operational. Non-slip footwear when patient is off stretcher. Physically safe environment: no spills, clutter or unnecessary equipment. Stretcher in lowest position, wheels locked, appropriate side rails in place.

## 2022-12-15 NOTE — ED ADULT NURSE NOTE - NSICDXPASTMEDICALHX_GEN_ALL_CORE_FT
PAST MEDICAL HISTORY:  DM (diabetes mellitus)     Drug abuse     HLD (hyperlipidemia)     HTN (hypertension)

## 2022-12-15 NOTE — ED ADULT NURSE NOTE - OBJECTIVE STATEMENT
61 y/o female c/o dry cough and SOB x3 days, as per pt, "My apartment has a lot of mold and I believe this is from that." Denies chest pain, fever, night sweats, neck stiffness.

## 2022-12-15 NOTE — ED PROVIDER NOTE - PATIENT PORTAL LINK FT
You can access the FollowMyHealth Patient Portal offered by [facility] by registering at the following website: [email or web address]. By joining [employer]’s FollowMyHealth portal, you will also be able to view your health information using other applications (apps) compatible with our system.

## 2022-12-15 NOTE — ED ADULT NURSE REASSESSMENT NOTE - NS ED NURSE REASSESS COMMENT FT1
pt assessed, pt A&Ox4, pt resting at this time, cough has improved at this time, RVP results pending, safety and comfort maintained.

## 2022-12-15 NOTE — ED PROVIDER NOTE - PROGRESS NOTE DETAILS
Pt feels improved after treatment.  Pt is ready for discharge and safe discharge has been established. Pt was treated for cough and showed improvement. Will d/c with outpatient follow-up. Strict return-precautions were given and understanding was verbalized by patient.  I have discussed the discharge plan with the patient. The patient agrees with the plan, as discussed. The patient understands Emergency Department diagnosis is a preliminary diagnosis often based on limited information and that the patient must adhere to the follow-up plan as discussed. The patient understands that if the symptoms worsen or if prescribed medications do not have the desired/planned effect that the patient may return to the Emergency Department at any time for further evaluation and treatment.
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
## 2022-12-15 NOTE — ED ADULT TRIAGE NOTE - CHIEF COMPLAINT QUOTE
Patient c/o worsening dry cough, shortness of breath x 3 days.  Denies chest pain, fever, night sweats, neck stiffness. EKG done

## 2023-03-06 ENCOUNTER — EMERGENCY (EMERGENCY)
Facility: HOSPITAL | Age: 63
LOS: 1 days | Discharge: HOME | End: 2023-03-06
Attending: EMERGENCY MEDICINE | Admitting: EMERGENCY MEDICINE
Payer: MEDICAID

## 2023-03-06 VITALS
SYSTOLIC BLOOD PRESSURE: 112 MMHG | DIASTOLIC BLOOD PRESSURE: 67 MMHG | HEIGHT: 59 IN | RESPIRATION RATE: 16 BRPM | HEART RATE: 100 BPM | OXYGEN SATURATION: 96 % | TEMPERATURE: 98 F | WEIGHT: 151.9 LBS

## 2023-03-06 VITALS
HEART RATE: 89 BPM | RESPIRATION RATE: 17 BRPM | OXYGEN SATURATION: 96 % | DIASTOLIC BLOOD PRESSURE: 80 MMHG | TEMPERATURE: 98 F | SYSTOLIC BLOOD PRESSURE: 112 MMHG

## 2023-03-06 PROCEDURE — 80053 COMPREHEN METABOLIC PANEL: CPT

## 2023-03-06 PROCEDURE — 85025 COMPLETE CBC W/AUTO DIFF WBC: CPT

## 2023-03-06 PROCEDURE — 99285 EMERGENCY DEPT VISIT HI MDM: CPT | Mod: 25

## 2023-03-06 PROCEDURE — 36415 COLL VENOUS BLD VENIPUNCTURE: CPT

## 2023-03-06 PROCEDURE — 93005 ELECTROCARDIOGRAM TRACING: CPT

## 2023-03-06 PROCEDURE — 71046 X-RAY EXAM CHEST 2 VIEWS: CPT

## 2023-03-06 PROCEDURE — 85730 THROMBOPLASTIN TIME PARTIAL: CPT

## 2023-03-06 PROCEDURE — 85610 PROTHROMBIN TIME: CPT

## 2023-03-06 PROCEDURE — 71046 X-RAY EXAM CHEST 2 VIEWS: CPT | Mod: 26

## 2023-03-06 PROCEDURE — 87635 SARS-COV-2 COVID-19 AMP PRB: CPT

## 2023-03-06 PROCEDURE — 99285 EMERGENCY DEPT VISIT HI MDM: CPT

## 2023-03-06 PROCEDURE — 84484 ASSAY OF TROPONIN QUANT: CPT

## 2023-03-06 PROCEDURE — 83735 ASSAY OF MAGNESIUM: CPT

## 2023-03-06 NOTE — ED ADULT NURSE NOTE - CHIEF COMPLAINT QUOTE
on friday PT had chest pain and SOB in Md office, was told to come to ED for EKG. did not seek medical attention on Friday. denies CP, SOB, dizziness currently. pmh HTN and DM

## 2023-03-06 NOTE — ED PROVIDER NOTE - CARE PROVIDER_API CALL
Adams Rivera (MD)  Cardiovascular Disease; Internal Medicine  Cardiology [facility], [address] [place] [address]  Phone: [phone]  Fax: [phone]  Follow Up Time: Urgent

## 2023-03-06 NOTE — ED ADULT NURSE NOTE - OBJECTIVE STATEMENT
62F PMH DM presents c/o non-radiating intermittent CP and SOB. pt was seen at PCP on friday and referred to come to ED but "I didn't feel like coming." EKG completed in triage. denies n/v/d, fevers/chills, dizziness/headache, leg swelling, cough. pt speaking in clear, complete sentences.

## 2023-03-06 NOTE — ED PROVIDER NOTE - NSFOLLOWUPINSTRUCTIONS_ED_ALL_ED_FT
PLEASE FOLLOW-UP WITH CARDIOLOGY IN 1-2 DAYS FOR RISK STRATIFICATION.    ANY IMAGING RESULTS GIVEN IN THE EMERGENCY DEPARTMENT ARE PRELIMINARY UNTIL FORMALLY READ BY A RADIOLOGIST. YOU WILL BE CONTACTED IF THERE ARE ANY SIGNIFICANT CHANGES IN THE FINAL READ.    PLEASE RETURN TO THE EMERGENCY DEPARTMENT IF FEVER, CHEST PAIN, SHORTNESS OF BREATH, ABDOMINAL PAIN, VOMITING, OTHER CONCERNING SYMPTOMS.    PLEASE CONTACT BETINA DIAZ ([facility] EMERGENCY DEPARTMENT CLINICAL REFERRAL COORDINATOR) TO ASSIST IN SCHEDULING YOUR FOLLOW-UP APPOINTMENT.    Monday - Friday 11am-7pm  [phone]  [email or web address]

## 2023-03-06 NOTE — ED PROVIDER NOTE - PHYSICAL EXAMINATION
CONST: nontoxic NAD speaking in full sentences  HEAD: atraumatic  EYES: conjunctivae clear  ENT: mmm  NECK: supple/FROM  CARD: rrr no murmurs  CHEST: ctab no r/r/w, no stridor/retractions/tripoding  ABD: soft, nd, nttp, no rebound/guarding  EXT: FROM, symmetric distal pulses intact  SKIN: warm, dry, no rash, no pedal edema/ttp/rash, cap refill <2sec  NEURO: a+ox3, 5/5 strength x4, gross sensation intact x4, baseline gait

## 2023-03-06 NOTE — ED PROVIDER NOTE - CLINICAL SUMMARY MEDICAL DECISION MAKING FREE TEXT BOX
hx obtained from pt. avss. nontoxic. NAD. no systemic sx. no active cp. no acute resp distress. no leukocytosis vs significant anemia vs electrolyte abnl. covid neg. trop wnl. ekg nonischemic. heart score 3. cxr w/o acute focal consol vs ptx vs pulm edema vs widened mediastinum. low risk by wells. unable to get a hold of dr marie. pt feels safe going home. no indication for inpatient hospitalization at this time. will dc w/ outpatient cards fu. strict return precautions. pt agrees w/ plan. questions answered.

## 2023-03-06 NOTE — ED PROVIDER NOTE - PATIENT PORTAL LINK FT
You can access the FollowMyHealth Patient Portal offered by [facility] by registering at the following website: [email or web address]. By joining [facility]’s FollowMyHealth portal, you will also be able to view your health information using other applications (apps) compatible with our system.

## 2023-03-06 NOTE — ED ADULT NURSE NOTE - IS THE PATIENT ABLE TO BE SCREENED?
----------------------------------------------------------------------------------------------------------  [facility], [place]   Lucy Woodard is a 24 year old -American Jose Rausch MD  PGY-1 Psychiatry Resident Lili Rubio MD , PhD.       Yes

## 2023-03-06 NOTE — ED ADULT TRIAGE NOTE - CHIEF COMPLAINT QUOTE
on friday PT had chest pain and SOB in Md office, was told to come to ED for EKG. did not seek medical attention on Friday. denies CP, SOB, dizziness currently. on friday PT had chest pain and SOB in Md office, was told to come to ED for EKG. did not seek medical attention on Friday. denies CP, SOB, dizziness currently. pmh HTN and DM

## 2023-03-06 NOTE — ED PROVIDER NOTE - OBJECTIVE STATEMENT
62F daily smoker (45py), htn, hld, niddm, asthma, recently seen at routine pcp appt (3/3/23) getting routine ekg w/ subsequent brief episode sob lasting seconds, none since. immediately referred to ED by pcp but pt refused at that time, now presenting today for evaluation. contrary to triage note, never cp. pt admits to having drank harriet type drink social w/ friends earlier that day. no etoh-dpt or daily etoh. +fhx cad/mi (mother 60s, father 70s). no fever/chills, no uri/cough, no cp, no abd pain/n/v, no pedal edema/pain/rash, no recent travel, no trauma, no recreational/ivdu, no prior cardiac risk stratification.

## 2023-03-09 DIAGNOSIS — R29.703 NIHSS SCORE 3: ICD-10-CM

## 2023-03-09 DIAGNOSIS — E78.5 HYPERLIPIDEMIA, UNSPECIFIED: ICD-10-CM

## 2023-03-09 DIAGNOSIS — I10 ESSENTIAL (PRIMARY) HYPERTENSION: ICD-10-CM

## 2023-03-09 DIAGNOSIS — R06.02 SHORTNESS OF BREATH: ICD-10-CM

## 2023-03-09 DIAGNOSIS — Z20.822 CONTACT WITH AND (SUSPECTED) EXPOSURE TO COVID-19: ICD-10-CM

## 2023-03-09 DIAGNOSIS — E11.9 TYPE 2 DIABETES MELLITUS WITHOUT COMPLICATIONS: ICD-10-CM

## 2023-03-09 DIAGNOSIS — J45.909 UNSPECIFIED ASTHMA, UNCOMPLICATED: ICD-10-CM

## 2023-03-09 DIAGNOSIS — F17.200 NICOTINE DEPENDENCE, UNSPECIFIED, UNCOMPLICATED: ICD-10-CM

## 2023-03-09 DIAGNOSIS — R07.9 CHEST PAIN, UNSPECIFIED: ICD-10-CM

## 2023-04-19 ENCOUNTER — EMERGENCY (EMERGENCY)
Facility: HOSPITAL | Age: 63
LOS: 1 days | Discharge: HOME | End: 2023-04-19
Attending: EMERGENCY MEDICINE | Admitting: EMERGENCY MEDICINE
Payer: MEDICAID

## 2023-04-19 VITALS
DIASTOLIC BLOOD PRESSURE: 73 MMHG | RESPIRATION RATE: 20 BRPM | TEMPERATURE: 98 F | HEART RATE: 99 BPM | SYSTOLIC BLOOD PRESSURE: 112 MMHG | OXYGEN SATURATION: 99 %

## 2023-04-19 VITALS
DIASTOLIC BLOOD PRESSURE: 78 MMHG | HEIGHT: 59 IN | RESPIRATION RATE: 20 BRPM | HEART RATE: 135 BPM | TEMPERATURE: 101 F | SYSTOLIC BLOOD PRESSURE: 122 MMHG | WEIGHT: 153 LBS | OXYGEN SATURATION: 93 %

## 2023-04-19 DIAGNOSIS — I10 ESSENTIAL (PRIMARY) HYPERTENSION: ICD-10-CM

## 2023-04-19 DIAGNOSIS — R00.0 TACHYCARDIA, UNSPECIFIED: ICD-10-CM

## 2023-04-19 DIAGNOSIS — R05.9 COUGH, UNSPECIFIED: ICD-10-CM

## 2023-04-19 DIAGNOSIS — Z20.822 CONTACT WITH AND (SUSPECTED) EXPOSURE TO COVID-19: ICD-10-CM

## 2023-04-19 DIAGNOSIS — R10.12 LEFT UPPER QUADRANT PAIN: ICD-10-CM

## 2023-04-19 DIAGNOSIS — J18.9 PNEUMONIA, UNSPECIFIED ORGANISM: ICD-10-CM

## 2023-04-19 DIAGNOSIS — F17.210 NICOTINE DEPENDENCE, CIGARETTES, UNCOMPLICATED: ICD-10-CM

## 2023-04-19 DIAGNOSIS — E78.5 HYPERLIPIDEMIA, UNSPECIFIED: ICD-10-CM

## 2023-04-19 DIAGNOSIS — J45.909 UNSPECIFIED ASTHMA, UNCOMPLICATED: ICD-10-CM

## 2023-04-19 DIAGNOSIS — E11.9 TYPE 2 DIABETES MELLITUS WITHOUT COMPLICATIONS: ICD-10-CM

## 2023-04-19 LAB
ALBUMIN SERPL ELPH-MCNC: 4 G/DL — SIGNIFICANT CHANGE UP (ref 3.3–5)
ALP SERPL-CCNC: 97 U/L — SIGNIFICANT CHANGE UP (ref 40–120)
ALT FLD-CCNC: 15 U/L — SIGNIFICANT CHANGE UP (ref 10–45)
ANION GAP SERPL CALC-SCNC: 12 MMOL/L — SIGNIFICANT CHANGE UP (ref 5–17)
AST SERPL-CCNC: 17 U/L — SIGNIFICANT CHANGE UP (ref 10–40)
BASE EXCESS BLDV CALC-SCNC: 7.2 MMOL/L — HIGH (ref -2–3)
BASOPHILS # BLD AUTO: 0.04 K/UL — SIGNIFICANT CHANGE UP (ref 0–0.2)
BASOPHILS NFR BLD AUTO: 0.2 % — SIGNIFICANT CHANGE UP (ref 0–2)
BILIRUB SERPL-MCNC: 0.7 MG/DL — SIGNIFICANT CHANGE UP (ref 0.2–1.2)
BUN SERPL-MCNC: 8 MG/DL — SIGNIFICANT CHANGE UP (ref 7–23)
CALCIUM SERPL-MCNC: 10.1 MG/DL — SIGNIFICANT CHANGE UP (ref 8.4–10.5)
CHLORIDE SERPL-SCNC: 97 MMOL/L — SIGNIFICANT CHANGE UP (ref 96–108)
CO2 BLDV-SCNC: 34.1 MMOL/L — HIGH (ref 22–26)
CO2 SERPL-SCNC: 29 MMOL/L — SIGNIFICANT CHANGE UP (ref 22–31)
CREAT SERPL-MCNC: 0.82 MG/DL — SIGNIFICANT CHANGE UP (ref 0.5–1.3)
EGFR: 82 ML/MIN/1.73M2 — SIGNIFICANT CHANGE UP
EOSINOPHIL # BLD AUTO: 0.02 K/UL — SIGNIFICANT CHANGE UP (ref 0–0.5)
EOSINOPHIL NFR BLD AUTO: 0.1 % — SIGNIFICANT CHANGE UP (ref 0–6)
FLUAV AG NPH QL: SIGNIFICANT CHANGE UP
FLUBV AG NPH QL: SIGNIFICANT CHANGE UP
GLUCOSE SERPL-MCNC: 106 MG/DL — HIGH (ref 70–99)
HCO3 BLDV-SCNC: 33 MMOL/L — HIGH (ref 22–29)
HCT VFR BLD CALC: 40.5 % — SIGNIFICANT CHANGE UP (ref 34.5–45)
HGB BLD-MCNC: 13.2 G/DL — SIGNIFICANT CHANGE UP (ref 11.5–15.5)
IMM GRANULOCYTES NFR BLD AUTO: 0.4 % — SIGNIFICANT CHANGE UP (ref 0–0.9)
LACTATE SERPL-SCNC: 1.3 MMOL/L — SIGNIFICANT CHANGE UP (ref 0.5–2)
LIDOCAIN IGE QN: 22 U/L — SIGNIFICANT CHANGE UP (ref 7–60)
LYMPHOCYTES # BLD AUTO: 12.4 % — LOW (ref 13–44)
LYMPHOCYTES # BLD AUTO: 2.02 K/UL — SIGNIFICANT CHANGE UP (ref 1–3.3)
MCHC RBC-ENTMCNC: 26.5 PG — LOW (ref 27–34)
MCHC RBC-ENTMCNC: 32.6 GM/DL — SIGNIFICANT CHANGE UP (ref 32–36)
MCV RBC AUTO: 81.3 FL — SIGNIFICANT CHANGE UP (ref 80–100)
MONOCYTES # BLD AUTO: 1.21 K/UL — HIGH (ref 0–0.9)
MONOCYTES NFR BLD AUTO: 7.4 % — SIGNIFICANT CHANGE UP (ref 2–14)
NEUTROPHILS # BLD AUTO: 12.94 K/UL — HIGH (ref 1.8–7.4)
NEUTROPHILS NFR BLD AUTO: 79.5 % — HIGH (ref 43–77)
NRBC # BLD: 0 /100 WBCS — SIGNIFICANT CHANGE UP (ref 0–0)
PCO2 BLDV: 48 MMHG — HIGH (ref 39–42)
PH BLDV: 7.44 — HIGH (ref 7.32–7.43)
PLATELET # BLD AUTO: 292 K/UL — SIGNIFICANT CHANGE UP (ref 150–400)
PO2 BLDV: 46 MMHG — HIGH (ref 25–45)
POTASSIUM SERPL-MCNC: 4 MMOL/L — SIGNIFICANT CHANGE UP (ref 3.5–5.3)
POTASSIUM SERPL-SCNC: 4 MMOL/L — SIGNIFICANT CHANGE UP (ref 3.5–5.3)
PROT SERPL-MCNC: 7.2 G/DL — SIGNIFICANT CHANGE UP (ref 6–8.3)
RBC # BLD: 4.98 M/UL — SIGNIFICANT CHANGE UP (ref 3.8–5.2)
RBC # FLD: 14.1 % — SIGNIFICANT CHANGE UP (ref 10.3–14.5)
RSV RNA NPH QL NAA+NON-PROBE: SIGNIFICANT CHANGE UP
SAO2 % BLDV: 79.1 % — SIGNIFICANT CHANGE UP (ref 67–88)
SARS-COV-2 RNA SPEC QL NAA+PROBE: SIGNIFICANT CHANGE UP
SODIUM SERPL-SCNC: 138 MMOL/L — SIGNIFICANT CHANGE UP (ref 135–145)
WBC # BLD: 16.29 K/UL — HIGH (ref 3.8–10.5)
WBC # FLD AUTO: 16.29 K/UL — HIGH (ref 3.8–10.5)

## 2023-04-19 PROCEDURE — 82962 GLUCOSE BLOOD TEST: CPT

## 2023-04-19 PROCEDURE — 99284 EMERGENCY DEPT VISIT MOD MDM: CPT | Mod: 25

## 2023-04-19 PROCEDURE — 85025 COMPLETE CBC W/AUTO DIFF WBC: CPT

## 2023-04-19 PROCEDURE — 87637 SARSCOV2&INF A&B&RSV AMP PRB: CPT

## 2023-04-19 PROCEDURE — 83605 ASSAY OF LACTIC ACID: CPT

## 2023-04-19 PROCEDURE — 99285 EMERGENCY DEPT VISIT HI MDM: CPT

## 2023-04-19 PROCEDURE — 74177 CT ABD & PELVIS W/CONTRAST: CPT | Mod: 26,MA

## 2023-04-19 PROCEDURE — 82803 BLOOD GASES ANY COMBINATION: CPT

## 2023-04-19 PROCEDURE — 71045 X-RAY EXAM CHEST 1 VIEW: CPT | Mod: 26

## 2023-04-19 PROCEDURE — 80053 COMPREHEN METABOLIC PANEL: CPT

## 2023-04-19 PROCEDURE — 74177 CT ABD & PELVIS W/CONTRAST: CPT | Mod: MA

## 2023-04-19 PROCEDURE — 36415 COLL VENOUS BLD VENIPUNCTURE: CPT

## 2023-04-19 PROCEDURE — 87040 BLOOD CULTURE FOR BACTERIA: CPT

## 2023-04-19 PROCEDURE — 83690 ASSAY OF LIPASE: CPT

## 2023-04-19 PROCEDURE — 96374 THER/PROPH/DIAG INJ IV PUSH: CPT | Mod: XU

## 2023-04-19 PROCEDURE — 94640 AIRWAY INHALATION TREATMENT: CPT

## 2023-04-19 PROCEDURE — 96375 TX/PRO/DX INJ NEW DRUG ADDON: CPT

## 2023-04-19 PROCEDURE — 71045 X-RAY EXAM CHEST 1 VIEW: CPT

## 2023-04-19 RX ORDER — ACETAMINOPHEN 500 MG
1000 TABLET ORAL ONCE
Refills: 0 | Status: COMPLETED | OUTPATIENT
Start: 2023-04-19 | End: 2023-04-19

## 2023-04-19 RX ORDER — PIPERACILLIN AND TAZOBACTAM 4; .5 G/20ML; G/20ML
3.38 INJECTION, POWDER, LYOPHILIZED, FOR SOLUTION INTRAVENOUS ONCE
Refills: 0 | Status: COMPLETED | OUTPATIENT
Start: 2023-04-19 | End: 2023-04-19

## 2023-04-19 RX ORDER — CEFPODOXIME PROXETIL 100 MG
1 TABLET ORAL
Qty: 20 | Refills: 0
Start: 2023-04-19 | End: 2023-04-28

## 2023-04-19 RX ORDER — DIATRIZOATE MEGLUMINE 180 MG/ML
30 INJECTION, SOLUTION INTRAVESICAL ONCE
Refills: 0 | Status: COMPLETED | OUTPATIENT
Start: 2023-04-19 | End: 2023-04-19

## 2023-04-19 RX ORDER — ALBUTEROL 90 UG/1
2 AEROSOL, METERED ORAL EVERY 6 HOURS
Refills: 0 | Status: DISCONTINUED | OUTPATIENT
Start: 2023-04-19 | End: 2023-04-23

## 2023-04-19 RX ORDER — DEXAMETHASONE 0.5 MG/5ML
6 ELIXIR ORAL ONCE
Refills: 0 | Status: COMPLETED | OUTPATIENT
Start: 2023-04-19 | End: 2023-04-19

## 2023-04-19 RX ORDER — TRAMADOL HYDROCHLORIDE 50 MG/1
1 TABLET ORAL
Qty: 10 | Refills: 0
Start: 2023-04-19 | End: 2023-04-23

## 2023-04-19 RX ORDER — MORPHINE SULFATE 50 MG/1
4 CAPSULE, EXTENDED RELEASE ORAL ONCE
Refills: 0 | Status: DISCONTINUED | OUTPATIENT
Start: 2023-04-19 | End: 2023-04-19

## 2023-04-19 RX ORDER — ALBUTEROL 90 UG/1
3 AEROSOL, METERED ORAL
Qty: 90 | Refills: 0
Start: 2023-04-19 | End: 2023-04-28

## 2023-04-19 RX ORDER — SODIUM CHLORIDE 9 MG/ML
1000 INJECTION INTRAMUSCULAR; INTRAVENOUS; SUBCUTANEOUS ONCE
Refills: 0 | Status: COMPLETED | OUTPATIENT
Start: 2023-04-19 | End: 2023-04-19

## 2023-04-19 RX ORDER — IPRATROPIUM/ALBUTEROL SULFATE 18-103MCG
3 AEROSOL WITH ADAPTER (GRAM) INHALATION ONCE
Refills: 0 | Status: COMPLETED | OUTPATIENT
Start: 2023-04-19 | End: 2023-04-19

## 2023-04-19 RX ADMIN — Medication 3 MILLILITER(S): at 17:30

## 2023-04-19 RX ADMIN — Medication 6 MILLIGRAM(S): at 17:30

## 2023-04-19 RX ADMIN — MORPHINE SULFATE 4 MILLIGRAM(S): 50 CAPSULE, EXTENDED RELEASE ORAL at 15:03

## 2023-04-19 RX ADMIN — PIPERACILLIN AND TAZOBACTAM 200 GRAM(S): 4; .5 INJECTION, POWDER, LYOPHILIZED, FOR SOLUTION INTRAVENOUS at 15:56

## 2023-04-19 RX ADMIN — SODIUM CHLORIDE 1000 MILLILITER(S): 9 INJECTION INTRAMUSCULAR; INTRAVENOUS; SUBCUTANEOUS at 14:43

## 2023-04-19 RX ADMIN — Medication 1000 MILLIGRAM(S): at 15:03

## 2023-04-19 RX ADMIN — ALBUTEROL 2 PUFF(S): 90 AEROSOL, METERED ORAL at 17:13

## 2023-04-19 RX ADMIN — DIATRIZOATE MEGLUMINE 30 MILLILITER(S): 180 INJECTION, SOLUTION INTRAVESICAL at 15:03

## 2023-04-19 NOTE — ED PROVIDER NOTE - CLINICAL SUMMARY MEDICAL DECISION MAKING FREE TEXT BOX
62 F w L sided abd pain- LUQ ttp- coarse dry cough with mild sob- has not needed to use inhalers- no ho pe/dvt  px 1/2 ppd smoker- marked improvement in pain- pain free now rr 16- hr improving into 90's- feels well- d/c home abx, nebs- si/sx to return to ED d/w patient

## 2023-04-19 NOTE — ED PROVIDER NOTE - OBJECTIVE STATEMENT
62F daily smoker (45py), htn, hld, niddm, asthma, 62F daily smoker (45py), htn, hld, niddm, asthma, co cough mild dyspnea with left upper abd pain worse with coughing  sharp pain- feels it more in left abd- no cp no n/v jammie po  mod severity  smokes 1/2 ppd  does not use inhalers normally

## 2023-04-19 NOTE — ED PROVIDER NOTE - NSFOLLOWUPINSTRUCTIONS_ED_ALL_ED_FT
Community-Acquired Pneumonia, Adult  Pneumonia is a lung infection that causes inflammation and the buildup of mucus and fluids in the lungs. This may cause coughing and difficulty breathing. Community-acquired pneumonia is pneumonia that develops in people who are not, and have not recently been, in a hospital or other health care facility.    Usually, pneumonia develops as a result of an illness that is caused by a virus, such as the common cold and the flu (influenza). It can also be caused by bacteria or fungi. While the common cold and influenza can pass from person to person (are contagious), pneumonia itself is not considered contagious.    What are the causes?    This condition may be caused by:  Viruses.  Bacteria.  Fungi, such as molds or mushrooms.  What increases the risk?  The following factors may make you more likely to develop this condition:  Having certain medical conditions, such as:  A long-term (chronic) disease, which may include chronic obstructive pulmonary disease (COPD), asthma, heart failure, cystic fibrosis, diabetes, kidney disease, sickle cell disease, and human immunodeficiency virus (HIV).  A condition that increases the risk of breathing in (aspirating) mucus and other fluids from your mouth and nose.  A weakened body defense system (immune system).  Having had your spleen removed (splenectomy). The spleen is the organ that helps fight germs and infections.  Not cleaning your teeth and gums well (poor dental hygiene).  Using tobacco products.  Traveling to places where germs that cause pneumonia are present.  Being near certain animals, or animal habitats, that have germs that cause pneumonia.  Being older than 65 years of age.  What are the signs or symptoms?  Symptoms of this condition include:  A dry cough or a wet (productive) cough.  A fever.  Sweating or chills.  Chest pain, especially when breathing deeply or coughing.  Fast breathing, difficulty breathing, or shortness of breath.  Tiredness (fatigue).  Muscle aches.  How is this diagnosed?    This condition may be diagnosed based on your medical history or a physical exam. You may also have tests, including:  Chest X-rays.  Tests of the level of oxygen and other gases in your blood.  Tests of:  Your blood.  Mucus from your lungs (sputum).  Fluid around your lungs (pleural fluid).  Your urine.  If your pneumonia is severe, other tests may be done to learn more about the cause.    How is this treated?  Treatment for this condition depends on many factors, such as the cause of your pneumonia, your medicines, and other medical conditions that you have.    For most adults, pneumonia may be treated at home. In some cases, treatment must happen in a hospital and may include:  Medicines that are given by mouth (orally) or through an IV, including:  Antibiotic medicines, if bacteria caused the pneumonia.  Medicines that kill viruses (antiviral medicines), if a virus caused the pneumonia.  Oxygen therapy.  Severe pneumonia, although rare, may require the following treatments:  Mechanical ventilation.This procedure uses a machine to help you breathe if you cannot breathe well on your own or maintain a safe level of blood oxygen.  Thoracentesis. This procedure removes any buildup of pleural fluid to help with breathing.  Follow these instructions at home:    Medicines    Take over-the-counter and prescription medicines only as told by your health care provider.  Take cough medicine only if you have trouble sleeping. Cough medicine can prevent your body from removing mucus from your lungs.  If you were prescribed an antibiotic medicine, take it as told by your health care provider. Do not stop taking the antibiotic even if you start to feel better.  Lifestyle        Do not drink alcohol.  Do not use any products that contain nicotine or tobacco, such as cigarettes, e-cigarettes, and chewing tobacco. If you need help quitting, ask your health care provider.  Eat a healthy diet. This includes plenty of vegetables, fruits, whole grains, low-fat dairy products, and lean protein.  General instructions    Rest a lot and get at least 8 hours of sleep each night.  Sleep in a partly upright position at night. Place a few pillows under your head or sleep in a reclining chair.  Return to your normal activities as told by your health care provider. Ask your health care provider what activities are safe for you.  Drink enough fluid to keep your urine pale yellow. This helps to thin the mucus in your lungs.  If your throat is sore, gargle with a salt–water mixture 3–4 times a day or as needed. To make a salt–water mixture, completely dissolve ½–1 tsp (3–6 g) of salt in 1 cup (237 mL) of warm water.  Keep all follow-up visits as told by your health care provider. This is important.  How is this prevented?  You can lower your risk of developing community-acquired pneumonia by:  Getting the pneumonia vaccine. There are different types and schedules of pneumonia vaccines. Ask your health care provider which option is best for you. Consider getting the pneumonia vaccine if:  You are older than 65 years of age.  You are 19–65 years of age and are receiving cancer treatment, have chronic lung disease, or have other medical conditions that affect your immune system. Ask your health care provider if this applies to you.  Getting your influenza vaccine every year. Ask your health care provider which type of vaccine is best for you.  Getting regular dental checkups.  Washing your hands often with soap and water for at least 20 seconds. If soap and water are not available, use hand .  Contact a health care provider if you have:  A fever.  Trouble sleeping because you cannot control your cough with cough medicine.  Get help right away if:  Your shortness of breath becomes worse.  Your chest pain increases.  Your sickness becomes worse, especially if you are an older adult or have a weak immune system.  You cough up blood.  These symptoms may represent a serious problem that is an emergency. Do not wait to see if the symptoms will go away. Get medical help right away. Call your local emergency services (911 in the [place] Do not drive yourself to the hospital.    Summary  Pneumonia is an infection of the lungs.  Community-acquired pneumonia develops in people who have not been in the hospital. It can be caused by bacteria, viruses, or fungi.  This condition may be treated with antibiotics or antiviral medicines.  Severe pneumonia may require a hospital stay and treatment to help with breathing.  This information is not intended to replace advice given to you by your health care provider. Make sure you discuss any questions you have with your health care provider.    Document Revised: 09/29/2020 Document Reviewed: 09/29/2020

## 2023-04-19 NOTE — ED ADULT NURSE NOTE - CHIEF COMPLAINT
Head,  normocephalic,  atraumatic,  Face,  Face within normal limits,  Ears,  External ears within normal limits,  Nose/Nasopharynx,  External nose  normal appearance,  nares patent,  no nasal discharge,  Mouth and Throat,  Oral cavity appearance normal, Lips,  Appearance normal The patient is a 62y Female complaining of abdominal pain.

## 2023-04-19 NOTE — ED ADULT NURSE NOTE - OBJECTIVE STATEMENT
Pt is 62 y.o female pt BIBEMS fro LUQ pain, cough and fever. Pt A&Ox4. Pt is conversive and has a steady gait. Pt has hx of DM type 2, HTN and HDL. Pt hooked to 02 at 3 lpm via nc and hooked to cardiac monitor. Pt denies n/v/d, cp, sob, dizziness, headache, tingling and numbness, headache,  sx. EKG done. IV inserted and labs sent. Assessment ongoing. Will cont to monitor.

## 2023-04-19 NOTE — ED ADULT TRIAGE NOTE - CHIEF COMPLAINT QUOTE
a&ox4 BIBA from home c.o LUQ abdominal pain starting yesterday. also reports a cough.  PMH of HTN, DM.

## 2023-04-19 NOTE — ED PROVIDER NOTE - PATIENT PORTAL LINK FT
You can access the FollowMyHealth Patient Portal offered by [facility] by registering at the following website: [email or web address]. By joining [facility]’s FollowMyHealth portal, you will also be able to view your health information using other applications (apps) compatible with our system.

## 2023-04-21 ENCOUNTER — APPOINTMENT (OUTPATIENT)
Dept: HEART AND VASCULAR | Facility: CLINIC | Age: 63
End: 2023-04-21

## 2023-04-24 LAB
CULTURE RESULTS: SIGNIFICANT CHANGE UP
CULTURE RESULTS: SIGNIFICANT CHANGE UP
SPECIMEN SOURCE: SIGNIFICANT CHANGE UP
SPECIMEN SOURCE: SIGNIFICANT CHANGE UP

## 2023-04-28 ENCOUNTER — APPOINTMENT (OUTPATIENT)
Dept: HEART AND VASCULAR | Facility: CLINIC | Age: 63
End: 2023-04-28
Payer: MEDICAID

## 2023-04-28 VITALS
OXYGEN SATURATION: 97 % | WEIGHT: 151.03 LBS | SYSTOLIC BLOOD PRESSURE: 116 MMHG | DIASTOLIC BLOOD PRESSURE: 84 MMHG | BODY MASS INDEX: 30.45 KG/M2 | HEART RATE: 107 BPM | HEIGHT: 59 IN | TEMPERATURE: 98.5 F

## 2023-04-28 DIAGNOSIS — R00.0 TACHYCARDIA, UNSPECIFIED: ICD-10-CM

## 2023-04-28 PROCEDURE — 99204 OFFICE O/P NEW MOD 45 MIN: CPT | Mod: 25

## 2023-04-28 PROCEDURE — 93000 ELECTROCARDIOGRAM COMPLETE: CPT

## 2023-04-28 RX ORDER — PRAVASTATIN SODIUM 40 MG/1
40 TABLET ORAL
Refills: 0 | Status: DISCONTINUED | COMMUNITY
End: 2023-04-28

## 2023-04-28 RX ORDER — CHROMIUM 200 MCG
TABLET ORAL
Refills: 0 | Status: DISCONTINUED | COMMUNITY
End: 2023-04-28

## 2023-04-28 RX ORDER — ATORVASTATIN CALCIUM 40 MG/1
40 TABLET, FILM COATED ORAL
Qty: 90 | Refills: 3 | Status: ACTIVE | COMMUNITY
Start: 2023-04-28 | End: 1900-01-01

## 2023-04-28 RX ORDER — OMEPRAZOLE 40 MG/1
40 CAPSULE, DELAYED RELEASE ORAL
Refills: 0 | Status: DISCONTINUED | COMMUNITY
End: 2023-04-28

## 2023-04-28 NOTE — REASON FOR VISIT
[FreeTextEntry1] : 63 y/o with DM, chronic smoker and untreated HLD with a ,  in 2018. A1c of 7.7.  No recent labs in chart.  She continues to smoke 1 PPD despite moderate COPD as delineated by Dr Santo.\par \par EKG: NSR, normal axis and intervals, no ST-Tw abnormalities.

## 2023-04-28 NOTE — PHYSICAL EXAM

## 2023-05-17 ENCOUNTER — APPOINTMENT (OUTPATIENT)
Dept: PULMONOLOGY | Facility: CLINIC | Age: 63
End: 2023-05-17
Payer: MEDICAID

## 2023-05-17 VITALS
OXYGEN SATURATION: 98 % | SYSTOLIC BLOOD PRESSURE: 112 MMHG | HEIGHT: 59 IN | HEART RATE: 105 BPM | BODY MASS INDEX: 30.44 KG/M2 | TEMPERATURE: 97.8 F | DIASTOLIC BLOOD PRESSURE: 70 MMHG | WEIGHT: 151 LBS

## 2023-05-17 PROCEDURE — 99214 OFFICE O/P EST MOD 30 MIN: CPT

## 2023-05-17 NOTE — CONSULT LETTER
[Dear  ___] : Dear  [unfilled], [Courtesy Letter:] : I had the pleasure of seeing your patient, [unfilled], in my office today. [Please see my note below.] : Please see my note below. [Consult Closing:] : Thank you very much for allowing me to participate in the care of this patient.  If you have any questions, please do not hesitate to contact me. [Sincerely,] : Sincerely, [FreeTextEntry2] : Tashi Mcdermott MD\par [address] \[place] [address] [FreeTextEntry3] : Eloise Ramirez MD, FCCP\par

## 2023-05-17 NOTE — ASSESSMENT
[FreeTextEntry1] : Data reviewed:\par \par LDCT [facility] 4/2021: no suspicious nodules\par LDCT R 11/2022 personally reviewed : no suspicious nodules\par PA/lat CXR R 5/2023 personally reviewed : clear\par \par [facility] 4/2022: mod obstruction, FEV1 58%\par \par Impression:\par COPD\par Tobacco dependence\par \par Plan:\par This is an asymptomatic smoker who probably has COPD based on spirometry.\par She is working on quitting.\par She is appropriately in LDCT.\par I suspect she is here for pre-op clearance though she doesn't know this. If that is the case, there is no pulmonary contraindication to shoulder surgery. She is in a chronic stable state. VTE ppx per surgery.

## 2023-05-17 NOTE — HISTORY OF PRESENT ILLNESS
[TextBox_4] : 05/17/2023: Follow up visit for this patient seen by Dr Santo in 2017 and again 2022. She has obstructed spirometry. She doesn't know why she's here. She has some cough. She has some dyspnea on exertion, nothing that limits her. No pulmonary exacerbations. She is smoking 3-4 cigs a day. She is having shoulder surgery on 5/26. I discovered this by asking her why she had a recent CXR at [facility]. Her only previous surgeries are C sections and a hernia repair with no pulmonary complications. No VTE hx. [ESS] : 10

## 2023-06-06 ENCOUNTER — EMERGENCY (EMERGENCY)
Facility: HOSPITAL | Age: 63
LOS: 1 days | Discharge: HOME | End: 2023-06-06
Attending: EMERGENCY MEDICINE | Admitting: EMERGENCY MEDICINE
Payer: MEDICAID

## 2023-06-06 VITALS
DIASTOLIC BLOOD PRESSURE: 74 MMHG | HEART RATE: 91 BPM | RESPIRATION RATE: 18 BRPM | SYSTOLIC BLOOD PRESSURE: 118 MMHG | OXYGEN SATURATION: 98 %

## 2023-06-06 VITALS
HEART RATE: 102 BPM | SYSTOLIC BLOOD PRESSURE: 122 MMHG | OXYGEN SATURATION: 97 % | WEIGHT: 139.99 LBS | TEMPERATURE: 97 F | HEIGHT: 59 IN | RESPIRATION RATE: 18 BRPM | DIASTOLIC BLOOD PRESSURE: 77 MMHG

## 2023-06-06 DIAGNOSIS — E11.9 TYPE 2 DIABETES MELLITUS WITHOUT COMPLICATIONS: ICD-10-CM

## 2023-06-06 DIAGNOSIS — J45.909 UNSPECIFIED ASTHMA, UNCOMPLICATED: ICD-10-CM

## 2023-06-06 DIAGNOSIS — I10 ESSENTIAL (PRIMARY) HYPERTENSION: ICD-10-CM

## 2023-06-06 DIAGNOSIS — R07.89 OTHER CHEST PAIN: ICD-10-CM

## 2023-06-06 DIAGNOSIS — F17.200 NICOTINE DEPENDENCE, UNSPECIFIED, UNCOMPLICATED: ICD-10-CM

## 2023-06-06 DIAGNOSIS — E78.5 HYPERLIPIDEMIA, UNSPECIFIED: ICD-10-CM

## 2023-06-06 PROCEDURE — 71046 X-RAY EXAM CHEST 2 VIEWS: CPT

## 2023-06-06 PROCEDURE — 71046 X-RAY EXAM CHEST 2 VIEWS: CPT | Mod: 26

## 2023-06-06 PROCEDURE — 71250 CT THORAX DX C-: CPT | Mod: 26,MG

## 2023-06-06 PROCEDURE — G1004: CPT

## 2023-06-06 PROCEDURE — 99284 EMERGENCY DEPT VISIT MOD MDM: CPT | Mod: 25

## 2023-06-06 PROCEDURE — 71250 CT THORAX DX C-: CPT | Mod: MG

## 2023-06-06 PROCEDURE — 99284 EMERGENCY DEPT VISIT MOD MDM: CPT

## 2023-06-06 NOTE — ED PROVIDER NOTE - NSFOLLOWUPINSTRUCTIONS_ED_ALL_ED_FT
YOU UNDERSTAND YOU ARE LEAVING PRIOR TO YOU CT CHEST RESULTS. YOU AGREE TO RETURN IF REQUIRED TO BASED ON FINDINGS.     AS DISCUSSED, PLEASE FOLLOW-UP WITH YOUR PCP, DR DILL, TOMORROW MORNING.    PLEASE RETURN TO THE EMERGENCY DEPARTMENT IF BLOODY COUGH, LEG SWELLING/RASH/APIN, FEVER, CHEST PAIN, SHORTNESS OF BREATH, ABDOMINAL PAIN, VOMITING, OTHER CONCERNING SYMPTOMS.    PLEASE CONTACT BETINA DIAZ ([facility] EMERGENCY DEPARTMENT CLINICAL REFERRAL COORDINATOR) TO ASSIST IN SCHEDULING YOUR FOLLOW-UP APPOINTMENT.    Monday - Friday 11am-7pm  [phone]  [email or web address]

## 2023-06-06 NOTE — ED PROVIDER NOTE - PHYSICAL EXAMINATION
CONST: nontoxic NAD speaking in full sentences  HEAD: atraumatic  EYES: conjunctivae clear  ENT: mmm  NECK: supple/FROM  CARD: rrr no murmurs  CHEST: ctab no r/r/w, +ttp R anterolateral chest wall w/o overlying skin changes  ABD: soft, nd, nttp, no rebound/guarding  EXT: FROM, symmetric distal pulses intact  SKIN: warm, dry, no rash, no pedal edema/ttp/rash, cap refill <2sec  NEURO: a+ox3, 5/5 strength x4, gross sensation intact x4, baseline gait

## 2023-06-06 NOTE — ED ADULT NURSE NOTE - NSFALLUNIVINTERV_ED_ALL_ED
Bed/Stretcher in lowest position, wheels locked, appropriate side rails in place/Call bell, personal items and telephone in reach/Instruct patient to call for assistance before getting out of bed/chair/stretcher/Non-slip footwear applied when patient is off stretcher/[place] to call system/Physically safe environment - no spills, clutter or unnecessary equipment/Purposeful proactive rounding/Room/bathroom lighting operational, light cord in reach

## 2023-06-06 NOTE — ED PROVIDER NOTE - CLINICAL SUMMARY MEDICAL DECISION MAKING FREE TEXT BOX
hx obtained from pt and chart review. avss. nontoxic. NAD. no systemic sx. reportedly chest wall pain only. no skin changes. cxr unchanged from prior. recently seen in ED (4/19/23) found to have incidental poss pna although neoplasm could not be excluded on ct a/p. s/p repeat ct chest, read pending. full capacity. requesting to leave prior to ct results. understands she's being evaluated for poss cancer and other intrathoracic causes. understands risks/benefits of leaving before results. states she will fu w/ dr marie tmw AM to review results. questions answered. strict return precautions.

## 2023-06-06 NOTE — ED ADULT TRIAGE NOTE - PAIN RATING/NUMBER SCALE (0-10): ACTIVITY
Teresa Stafford is a 67 year old female presenting to the walk-in clinic today for a cough. Cough started a couple days ago.  Wants a chest Xray.   Some ear pain for 3 days now       Patient would like communication of their results via:        Cell Phone:   Telephone Information:   Mobile [phone]     Okay to leave a message containing results? Yes       5 (moderate pain)

## 2023-06-06 NOTE — ED PROVIDER NOTE - OBJECTIVE STATEMENT
62F daily smoker (45py), htn, hld, niddm, asthma, recently seen in ED (4/19/23) found to have incidental poss pna although neoplasm could not be excluded on ct a/p, now c/o 3d insidious constant R lower chest achiness. did not get fu imaging since last ED visit to ensure resolution of suspected L pna. no fever/chills, no ha/dizziness, no uri/cough, no sob, no palpitations, no abd pain/n/v, no back/flank pain, no pedal edema/pain/rash, no exogenous hormones, no recent travel, no trauma, no etoh-dpt/ivdu. 62F daily smoker (45py), htn, hld, niddm, asthma, recently seen in ED (4/19/23) found to have incidental poss pna although neoplasm could not be excluded on ct a/p, now c/o 3d insidious constant R lower chest wall achiness. did not get fu imaging since last ED visit to ensure resolution of suspected L pna. no fever/chills, no ha/dizziness, no uri/cough, no sob, no palpitations, no abd pain/n/v, no back/flank pain, no pedal edema/pain/rash, no exogenous hormones, no recent travel, no trauma, no etoh-dpt/ivdu.

## 2023-06-06 NOTE — ED PROVIDER NOTE - PROGRESS NOTE DETAILS
full capacity. requesting to leave prior to ct results. understands she's being evaluated for poss cancer and other intrathoracic causes. understands risks/benefits of leaving before results. states she will fu w/ dr marie tmw AM to review results. questions answered. strict return precautions.

## 2023-06-06 NOTE — ED ADULT NURSE NOTE - OBJECTIVE STATEMENT
62y Female A&OX3 to ED c/o Right rib pain since Friday. Pt denies fall, nor trauma. Reports past pneumonia infection to Left lung in April. Noted dry cough. Denies chest pain, fever, chills, n/v/d, sob, nor edema.

## 2023-06-06 NOTE — ED PROVIDER NOTE - PATIENT PORTAL LINK FT
You can access the FollowMyHealth Patient Portal offered by [facility] by registering at the following website: [email or web address]. By joining [employer]’s FollowMyHealth portal, you will also be able to view your health information using other applications (apps) compatible with our system.

## 2023-07-28 ENCOUNTER — EMERGENCY (EMERGENCY)
Facility: HOSPITAL | Age: 63
LOS: 1 days | Discharge: HOME | End: 2023-07-28
Admitting: EMERGENCY MEDICINE
Payer: MEDICAID

## 2023-07-28 VITALS
HEART RATE: 109 BPM | OXYGEN SATURATION: 97 % | SYSTOLIC BLOOD PRESSURE: 120 MMHG | RESPIRATION RATE: 18 BRPM | DIASTOLIC BLOOD PRESSURE: 78 MMHG

## 2023-07-28 VITALS
OXYGEN SATURATION: 96 % | RESPIRATION RATE: 20 BRPM | HEART RATE: 105 BPM | DIASTOLIC BLOOD PRESSURE: 80 MMHG | TEMPERATURE: 98 F | SYSTOLIC BLOOD PRESSURE: 116 MMHG | HEIGHT: 59 IN | WEIGHT: 158.07 LBS

## 2023-07-28 LAB
HPIV4 RNA SPEC QL NAA+PROBE: DETECTED
RAPID RVP RESULT: DETECTED
SARS-COV-2 RNA SPEC QL NAA+PROBE: SIGNIFICANT CHANGE UP

## 2023-07-28 PROCEDURE — 94640 AIRWAY INHALATION TREATMENT: CPT

## 2023-07-28 PROCEDURE — 71046 X-RAY EXAM CHEST 2 VIEWS: CPT | Mod: 26

## 2023-07-28 PROCEDURE — 99284 EMERGENCY DEPT VISIT MOD MDM: CPT

## 2023-07-28 PROCEDURE — 71046 X-RAY EXAM CHEST 2 VIEWS: CPT

## 2023-07-28 PROCEDURE — 96372 THER/PROPH/DIAG INJ SC/IM: CPT

## 2023-07-28 PROCEDURE — 99285 EMERGENCY DEPT VISIT HI MDM: CPT | Mod: 25

## 2023-07-28 PROCEDURE — 0225U NFCT DS DNA&RNA 21 SARSCOV2: CPT

## 2023-07-28 RX ORDER — IPRATROPIUM/ALBUTEROL SULFATE 18-103MCG
3 AEROSOL WITH ADAPTER (GRAM) INHALATION ONCE
Refills: 0 | Status: COMPLETED | OUTPATIENT
Start: 2023-07-28 | End: 2023-07-28

## 2023-07-28 RX ORDER — KETOROLAC TROMETHAMINE 30 MG/ML
15 SYRINGE (ML) INJECTION ONCE
Refills: 0 | Status: DISCONTINUED | OUTPATIENT
Start: 2023-07-28 | End: 2023-07-28

## 2023-07-28 RX ORDER — ALBUTEROL 90 UG/1
2 AEROSOL, METERED ORAL
Qty: 1 | Refills: 0
Start: 2023-07-28 | End: 2023-08-26

## 2023-07-28 RX ADMIN — Medication 3 MILLILITER(S): at 12:33

## 2023-07-28 RX ADMIN — Medication 3 MILLILITER(S): at 11:51

## 2023-07-28 RX ADMIN — Medication 100 MILLIGRAM(S): at 11:50

## 2023-07-28 RX ADMIN — Medication 15 MILLIGRAM(S): at 12:17

## 2023-07-28 RX ADMIN — Medication 15 MILLIGRAM(S): at 11:50

## 2023-07-28 NOTE — ED PROVIDER NOTE - NSFOLLOWUPINSTRUCTIONS_ED_ALL_ED_FT
FEVER:   Take tylenol 650mg up to four times daily for fever. You can also take ibuprofen 600mg up to three times daily. Keeping your fever down will help you feel better overall.    CONGESTION:   Use a saline nasal spray to help keep nasal passages moist and allow you to blow your nose easier.  Using a neti pot (a sinus irrigation system) may also provide relief.  If you do not have high blood pressure, you can take sudafed. This is over the counter, but you must ask the pharmacist for the medication.    SORE THROAT:  Use cepacol lozenges or chloraseptic spray.   Tea with honey may also provide relief.    COUGH:  Take one tab of tessalon perles up to three times daily for cough.   Use 2 puffs of albuterol inhaler every 4 hours as needed.     OTHER:  Take small, continuous sips of clear fluids to stay well hydrated (water, gatorade, pedialyte).  Eat bland foods (toast, rice, broth).  You will receive the results of your respiratory viral testing.    Return to the Emergency Department if you develop chest pain, shortness of breath, difficulty eating or drinking due to vomiting, or any other concerns.

## 2023-07-28 NOTE — ED ADULT NURSE NOTE - OBJECTIVE STATEMENT
Patient w/ multiple medical complaints, headache, chest congestion w/ nasal congestion and runny nose, w/ rib pains, sweating episodes, no chest pain or SOB, no difficulty speaking in long sentences.  pMHx   DM, HTN, HLD, drug abuse. Patient w/ multiple medical complaints, headache, chest congestion w/ nasal congestion and runny nose, w/ rib pains, cough w/ phlegm, sweating episodes, no chest pain or SOB, no difficulty speaking in long sentences.  Some wheezing noted on bilateral lung fields.  pMHx   DM, HTN, HLD, drug abuse.

## 2023-07-28 NOTE — ED PROVIDER NOTE - NS ED ROS FT
Constitutional: C/O Chills, Night sweats, fatigue.  No fever.   Eyes: No redness. No discharge. No vision change.   ENT: C/O sore throat, rhinorrhea , post-nasal drip. No ear pain.  Cardiovascular: No chest pain. Denies peripheral swelling.   Respiratory: C/O productive cough with yellow sputum. C/O mild shortness of breath. Denies orthopnea or paroxysmal nocturnal dyspnea.   GI: C/O episode of nausea/ vomiting x 1 last night (non bloody emesis).  No abdominal pain. No diarrhea.   MSK: C/O Generalized diffuse myalgia and hyperalgesia. C/O Rib pain. No joint pain,  No back pain.   Skin: No rash. No abrasions. Denies skin changes.   Neuro: Complaint of generalized weakness. No numbness/tingling.   Psych: No known mental health issues. Constitutional: No fever. No chills.  Eyes: No redness. No discharge. No vision change.   ENT: No sore throat. No ear pain. +congestion.  Cardiovascular: No chest pain. No leg swelling.  Respiratory: +cough. No shortness of breath. +wheezing.  GI: No abdominal pain. No vomiting. No diarrhea.   MSK: No joint pain. No back pain.   Skin: No rash. No abrasions.   Neuro: No numbness. No weakness.   Psych: No known mental health issues.

## 2023-07-28 NOTE — ED PROVIDER NOTE - OBJECTIVE STATEMENT
Pt, F, 62 years old current smoker w/ hx of DM2, HDL, HTN,  w/ complaint of rhinorrhea,  chills, fatigue, excessive sweating, productive cough w/ yellow sputum, SOB, Sore throat, Myalgia, Nausea  x 3 days. Patient states that on Tuesday night was unable to sleep, began to experience post-nasal drip and rhinorrhea and shortly after developed productive cough. Patient states that she took Mucinex w/ no relief and that she feels extremely congested. Patient has additional complaint of intermittent nausea and states that she vomited last night (clear/non bloody). Denies fever, current dizziness/ nausea/diarrhea, chest pain, numbness, tingling, denies bloody mucous/ stool / emesis/urine. 63yo female with pmhx of DM2, HDL, HTN presents with runny nose, chills, and productive cough w/ yellow sputum, wheezing x3 days. She reports difficulty sleeping due to post-nasal drip and rhinorrhea and productive cough. Took Mucinex w/ no relief. She denies fever, chills, chest pain, shortness of breath, abdominal pain, vomiting, diarrhea. She is a current every day smoker, but has not smoked during this illness. She denies h/o asthma or COPD.

## 2023-07-28 NOTE — ED PROVIDER NOTE - CLINICAL SUMMARY MEDICAL DECISION MAKING FREE TEXT BOX
Yes...
Pt afebrile and hemodynamically stable. She is well appearing. No hypoxia or tachypnea. She has coarse breath sounds with scattered wheezing. Improved after duoneb x2. Suspect viral illness with asthma/copd exacerbation. CXR without acute cardiopulmonary abnormality. RVP pending. Pt has inhaler and neb at home. Will dc with tessalon perles. Return precautions given.

## 2023-07-28 NOTE — ED ADULT TRIAGE NOTE - CHIEF COMPLAINT QUOTE
Pt presents to ED c/o cough, runny nose, headache x 3 days. denies chest pain, fever, dizziness. Speaking in clear full sentences, eating crackers in triage.

## 2023-07-28 NOTE — ED ADULT NURSE NOTE - NSFALLUNIVINTERV_ED_ALL_ED
Bed/Stretcher in lowest position, wheels locked, appropriate side rails in place/Call bell, personal items and telephone in reach/Instruct patient to call for assistance before getting out of bed/chair/stretcher/Non-slip footwear applied when patient is off stretcher/[place] to call system/Physically safe environment - no spills, clutter or unnecessary equipment/Purposeful proactive rounding/Room/bathroom lighting operational, light cord in reach

## 2023-07-28 NOTE — ED ADULT NURSE REASSESSMENT NOTE - NS ED NURSE REASSESS COMMENT FT1
Cough  and all symptoms improved s/p meds, neb TX, vital signs stable, Xray resulted.  No new symptom complaint.  Discharged to home in stable condition.
Janes santa at [place] [facility] notified. Pending dc.

## 2023-07-28 NOTE — ED PROVIDER NOTE - PHYSICAL EXAMINATION
VITAL SIGNS: I have reviewed nursing notes and confirm.  CONSTITUTIONAL: Well-developed; well-nourished; in no acute distress.   SKIN:  warm and dry, no acute rash. No cyanosis or pallor of mucous membranes.  HEAD:  normocephalic, atraumatic.  EYES: PERRL, EOM intact; conjunctiva and sclera clear.  ENT: + Nasal discharge (clear/yellow). Airway clear.   NECK: Supple; non tender.  CARD: S1, S2 normal; no murmurs, gallops, or rubs. Regular rate and rhythm.   RESP: No accessory muscle use/tripod use/retractions. Lung sounds reveal course rhonchi all fields w/ scattered wheezing. Does not clear w/ cough.   ABD: Normal bowel sounds; soft; non-distended; non-tender; no guarding/ rebound.  EXT: Normal ROM. No clubbing, cyanosis or edema. 2+ pulses to b/l ue/le.  NEURO: Alert, oriented, grossly unremarkable  PSYCH: Cooperative, mood and affect appropriate. VITAL SIGNS: I have reviewed nursing notes and confirm.  CONSTITUTIONAL: Well-developed; in no acute distress.   SKIN:  warm and dry, no acute rash. No cyanosis or pallor of mucous membranes.  HEAD:  normocephalic, atraumatic.  EYES: PERRL, EOM intact; conjunctiva and sclera clear.  ENT: + Nasal discharge (clear/yellow). Airway clear.   NECK: Supple; non tender.  CARD: S1, S2 normal; no murmurs, gallops, or rubs. Regular rate and rhythm.   RESP: No accessory muscle use/tripod use/retractions. Lung sounds reveal course rhonchi all fields w/ scattered wheezing. Does not clear w/ cough.   ABD: Normal bowel sounds; soft; non-distended; non-tender; no guarding/ rebound.  EXT: Normal ROM. No clubbing, cyanosis or edema. 2+ pulses to b/l ue/le.  NEURO: Alert, oriented, grossly unremarkable  PSYCH: Cooperative, mood and affect appropriate. VITAL SIGNS: I have reviewed nursing notes and confirm.  CONSTITUTIONAL: Well-developed; in no acute distress.   SKIN:  warm and dry, no acute rash. No cyanosis or pallor of mucous membranes.  HEAD:  normocephalic, atraumatic.  EYES: PERRL, EOM intact; conjunctiva and sclera clear.  ENT: + Nasal discharge (clear/yellow). Airway clear.   NECK: Supple; non tender.  CARD: S1, S2 normal; no murmurs, gallops, or rubs. Regular rate and rhythm.   RESP: No accessory muscle use/tripod use/retractions. Coarse BS with scattered wheezing. Does not clear w/ cough.   ABD: Normal bowel sounds; soft; non-distended; non-tender; no guarding/ rebound.  EXT: Normal ROM. No clubbing, cyanosis or edema. 2+ pulses to b/l ue/le.  NEURO: Alert, oriented, grossly unremarkable  PSYCH: Cooperative, mood and affect appropriate.

## 2023-07-28 NOTE — ED PROVIDER NOTE - PATIENT PORTAL LINK FT
You can access the FollowMyHealth Patient Portal offered by [facility] by registering at the following website: [email or web address]. By joining [email or web address]’s FollowMyHealth portal, you will also be able to view your health information using other applications (apps) compatible with our system.

## 2023-07-31 ENCOUNTER — APPOINTMENT (OUTPATIENT)
Dept: HEART AND VASCULAR | Facility: CLINIC | Age: 63
End: 2023-07-31
Payer: MEDICAID

## 2023-07-31 VITALS
TEMPERATURE: 97.8 F | WEIGHT: 153.03 LBS | SYSTOLIC BLOOD PRESSURE: 118 MMHG | OXYGEN SATURATION: 99 % | HEART RATE: 94 BPM | HEIGHT: 59 IN | BODY MASS INDEX: 30.85 KG/M2 | DIASTOLIC BLOOD PRESSURE: 66 MMHG

## 2023-07-31 DIAGNOSIS — F17.200 NICOTINE DEPENDENCE, UNSPECIFIED, UNCOMPLICATED: ICD-10-CM

## 2023-07-31 DIAGNOSIS — E66.9 OBESITY, UNSPECIFIED: ICD-10-CM

## 2023-07-31 DIAGNOSIS — E11.9 TYPE 2 DIABETES MELLITUS WITHOUT COMPLICATIONS: ICD-10-CM

## 2023-07-31 DIAGNOSIS — E78.5 HYPERLIPIDEMIA, UNSPECIFIED: ICD-10-CM

## 2023-07-31 DIAGNOSIS — I10 ESSENTIAL (PRIMARY) HYPERTENSION: ICD-10-CM

## 2023-07-31 DIAGNOSIS — R68.83 CHILLS (WITHOUT FEVER): ICD-10-CM

## 2023-07-31 DIAGNOSIS — R05.9 COUGH, UNSPECIFIED: ICD-10-CM

## 2023-07-31 DIAGNOSIS — Z20.822 CONTACT WITH AND (SUSPECTED) EXPOSURE TO COVID-19: ICD-10-CM

## 2023-07-31 DIAGNOSIS — E11.9 TYPE 2 DIABETES MELLITUS W/OUT COMPLICATIONS: ICD-10-CM

## 2023-07-31 PROCEDURE — 93306 TTE W/DOPPLER COMPLETE: CPT

## 2023-07-31 PROCEDURE — 99214 OFFICE O/P EST MOD 30 MIN: CPT

## 2023-07-31 RX ORDER — HYDROCHLOROTHIAZIDE 12.5 MG/1
12.5 TABLET ORAL
Refills: 0 | Status: DISCONTINUED | COMMUNITY
End: 2023-07-31

## 2023-07-31 RX ORDER — METOPROLOL SUCCINATE 25 MG/1
25 TABLET, EXTENDED RELEASE ORAL
Qty: 90 | Refills: 3 | Status: ACTIVE | COMMUNITY
Start: 2023-07-31 | End: 1900-01-01

## 2023-07-31 NOTE — REASON FOR VISIT
[FreeTextEntry1] : 61 y/o with DM, chronic smoker and untreated HLD with a ,  in 2018. A1c of 7.7.  No recent labs in chart.  She continues to smoke 1 PPD despite moderate COPD as delineated by Dr Santo.  EKG: NSR, normal axis and intervals, no ST-Tw abnormalities.   7/31/23  Smoking much less, echo today with [facility]

## 2023-07-31 NOTE — ASSESSMENT
[FreeTextEntry1] : HTN- controlled  [facility]- resting gradient of 16 mm and 36 with Valsalva.  DC HCTZ and start Toprol 25 mg.  HLD- not on statin, Atorvastatin 40 mg ordered  DM- diet reviewed  Tobacco- discussed in detail, options noted  High risk for CAD- Coronary Calcium Score ordered  Tachycardia- Sinus Tach, echo

## 2023-10-23 ENCOUNTER — EMERGENCY (EMERGENCY)
Facility: HOSPITAL | Age: 63
LOS: 1 days | Discharge: HOME | End: 2023-10-23
Admitting: STUDENT IN AN ORGANIZED HEALTH CARE EDUCATION/TRAINING PROGRAM
Payer: MEDICAID

## 2023-10-23 VITALS
SYSTOLIC BLOOD PRESSURE: 145 MMHG | TEMPERATURE: 98 F | WEIGHT: 160.06 LBS | DIASTOLIC BLOOD PRESSURE: 84 MMHG | RESPIRATION RATE: 20 BRPM | HEART RATE: 77 BPM | OXYGEN SATURATION: 97 % | HEIGHT: 59 IN

## 2023-10-23 LAB
RAPID RVP RESULT: DETECTED
RAPID RVP RESULT: DETECTED
RV+EV RNA SPEC QL NAA+PROBE: DETECTED
RV+EV RNA SPEC QL NAA+PROBE: DETECTED
SARS-COV-2 RNA SPEC QL NAA+PROBE: SIGNIFICANT CHANGE UP
SARS-COV-2 RNA SPEC QL NAA+PROBE: SIGNIFICANT CHANGE UP

## 2023-10-23 PROCEDURE — 71046 X-RAY EXAM CHEST 2 VIEWS: CPT | Mod: 26

## 2023-10-23 PROCEDURE — 94640 AIRWAY INHALATION TREATMENT: CPT

## 2023-10-23 PROCEDURE — 0225U NFCT DS DNA&RNA 21 SARSCOV2: CPT

## 2023-10-23 PROCEDURE — 71046 X-RAY EXAM CHEST 2 VIEWS: CPT

## 2023-10-23 PROCEDURE — 99284 EMERGENCY DEPT VISIT MOD MDM: CPT

## 2023-10-23 PROCEDURE — 99283 EMERGENCY DEPT VISIT LOW MDM: CPT | Mod: 25

## 2023-10-23 RX ORDER — IPRATROPIUM/ALBUTEROL SULFATE 18-103MCG
3 AEROSOL WITH ADAPTER (GRAM) INHALATION ONCE
Refills: 0 | Status: COMPLETED | OUTPATIENT
Start: 2023-10-23 | End: 2023-10-23

## 2023-10-23 RX ORDER — ALBUTEROL 90 UG/1
2 AEROSOL, METERED ORAL
Qty: 1 | Refills: 0
Start: 2023-10-23

## 2023-10-23 RX ADMIN — Medication 3 MILLILITER(S): at 19:34

## 2023-10-23 NOTE — ED ADULT NURSE NOTE - NSFALLUNIVINTERV_ED_ALL_ED
Bed/Stretcher in lowest position, wheels locked, appropriate side rails in place/Call bell, personal items and telephone in reach/Instruct patient to call for assistance before getting out of bed/chair/stretcher/Non-slip footwear applied when patient is off stretcher/[place] to call system/Physically safe environment - no spills, clutter or unnecessary equipment/Purposeful proactive rounding/Room/bathroom lighting operational, light cord in reach

## 2023-10-23 NOTE — ED PROVIDER NOTE - PATIENT PORTAL LINK FT
You can access the FollowMyHealth Patient Portal offered by [facility] by registering at the following website: [email or web address]. By joining [employer]’s FollowMyHealth portal, you will also be able to view your health information using other applications (apps) compatible with our system.

## 2023-10-23 NOTE — ED ADULT TRIAGE NOTE - BP NONINVASIVE DIASTOLIC (MM HG)
86 Reason for Visit: Annual    Last Pap: 3/29/19  Neg   ECC present      (HPV 2017 Neg)  Last Mammo: 19  Benign after R Dx  Contraception:     Medication: Estrogen Cream    Chronic Illness:  CHF, DM, HTN,     PLAN: 3/29/19  1.  The patient wanted to continue her Premarin cream, and will, 1 gram twice a week.  She will contact me when she needs a refill and will send it to the [facility].  2.  Pap was performed because of lack of endocervical cells on her last Pap.  3.  Yearly mammograms.  4.  Labs are up-to-date.  5.  Colonoscopy in .      OB History    Para Term  AB Living   7 4 0 0 3 3   SAB TAB Ectopic Molar Multiple Live Births   3 0 0 0 0 4

## 2023-10-23 NOTE — ED ADULT TRIAGE NOTE - CHIEF COMPLAINT QUOTE
Pt presents to ED here for cough for 4 days. Pt denies cp, sob, fever, chills, n/v/d, headaches. Pt has hx of HTN, HLD and DM. Pt A&Ox4, conversive in full sentences and ambulatory.

## 2023-10-23 NOTE — ED PROVIDER NOTE - CLINICAL SUMMARY MEDICAL DECISION MAKING FREE TEXT BOX
62 y/o f hx smoking, HTN, DM presents c/o cough x 4 days with nasal congestion.  Pt afebrile in ED, cxr neg, given neb with some improvement, suspect likely viral etiology of sx.  Will d/c, rx albuterol inhaler and tessalon perles, f/u pmd, return to ED if sx worsen.

## 2023-10-23 NOTE — ED ADULT NURSE NOTE - OBJECTIVE STATEMENT
Pt A&ox4 and able to speak in complete sentences. pt arrived d/t cough for four days. pt denies cp, n, v, lightheadedness, dizziness, fevers, chills, headache, sinus drip. pt ambulatory with steady gait. pt active smoker.

## 2023-10-23 NOTE — ED PROVIDER NOTE - OBJECTIVE STATEMENT
62 y/o f hx HTN, DM, smoking presents c/o cough, nasal congestion x 4 days.  Pt denies SOB, fever, chills, CP, sore throat, all other ROS negative.

## 2023-10-27 DIAGNOSIS — R05.9 COUGH, UNSPECIFIED: ICD-10-CM

## 2023-10-27 DIAGNOSIS — I10 ESSENTIAL (PRIMARY) HYPERTENSION: ICD-10-CM

## 2023-10-27 DIAGNOSIS — J06.9 ACUTE UPPER RESPIRATORY INFECTION, UNSPECIFIED: ICD-10-CM

## 2023-10-27 DIAGNOSIS — Z20.822 CONTACT WITH AND (SUSPECTED) EXPOSURE TO COVID-19: ICD-10-CM

## 2023-10-27 DIAGNOSIS — F17.200 NICOTINE DEPENDENCE, UNSPECIFIED, UNCOMPLICATED: ICD-10-CM

## 2023-10-27 DIAGNOSIS — E11.9 TYPE 2 DIABETES MELLITUS WITHOUT COMPLICATIONS: ICD-10-CM

## 2024-01-12 ENCOUNTER — EMERGENCY (EMERGENCY)
Facility: HOSPITAL | Age: 64
LOS: 1 days | Discharge: HOME | End: 2024-01-12
Admitting: STUDENT IN AN ORGANIZED HEALTH CARE EDUCATION/TRAINING PROGRAM
Payer: COMMERCIAL

## 2024-01-12 VITALS
RESPIRATION RATE: 18 BRPM | SYSTOLIC BLOOD PRESSURE: 115 MMHG | DIASTOLIC BLOOD PRESSURE: 76 MMHG | OXYGEN SATURATION: 96 % | TEMPERATURE: 98 F | HEART RATE: 100 BPM

## 2024-01-12 VITALS
WEIGHT: 164.91 LBS | HEART RATE: 100 BPM | DIASTOLIC BLOOD PRESSURE: 78 MMHG | OXYGEN SATURATION: 96 % | SYSTOLIC BLOOD PRESSURE: 115 MMHG | RESPIRATION RATE: 18 BRPM | TEMPERATURE: 98 F

## 2024-01-12 DIAGNOSIS — R05.9 COUGH, UNSPECIFIED: ICD-10-CM

## 2024-01-12 DIAGNOSIS — J45.909 UNSPECIFIED ASTHMA, UNCOMPLICATED: ICD-10-CM

## 2024-01-12 DIAGNOSIS — Z20.822 CONTACT WITH AND (SUSPECTED) EXPOSURE TO COVID-19: ICD-10-CM

## 2024-01-12 LAB
APPEARANCE UR: CLEAR — SIGNIFICANT CHANGE UP
APPEARANCE UR: CLEAR — SIGNIFICANT CHANGE UP
BACTERIA # UR AUTO: NEGATIVE /HPF — SIGNIFICANT CHANGE UP
BACTERIA # UR AUTO: NEGATIVE /HPF — SIGNIFICANT CHANGE UP
BILIRUB UR-MCNC: NEGATIVE — SIGNIFICANT CHANGE UP
BILIRUB UR-MCNC: NEGATIVE — SIGNIFICANT CHANGE UP
CAST: 3 /LPF — SIGNIFICANT CHANGE UP (ref 0–4)
CAST: 3 /LPF — SIGNIFICANT CHANGE UP (ref 0–4)
COLOR SPEC: YELLOW — SIGNIFICANT CHANGE UP
COLOR SPEC: YELLOW — SIGNIFICANT CHANGE UP
DIFF PNL FLD: NEGATIVE — SIGNIFICANT CHANGE UP
DIFF PNL FLD: NEGATIVE — SIGNIFICANT CHANGE UP
FLUAV AG NPH QL: SIGNIFICANT CHANGE UP
FLUAV AG NPH QL: SIGNIFICANT CHANGE UP
FLUBV AG NPH QL: SIGNIFICANT CHANGE UP
FLUBV AG NPH QL: SIGNIFICANT CHANGE UP
GLUCOSE UR QL: NEGATIVE MG/DL — SIGNIFICANT CHANGE UP
GLUCOSE UR QL: NEGATIVE MG/DL — SIGNIFICANT CHANGE UP
KETONES UR-MCNC: NEGATIVE MG/DL — SIGNIFICANT CHANGE UP
KETONES UR-MCNC: NEGATIVE MG/DL — SIGNIFICANT CHANGE UP
LEUKOCYTE ESTERASE UR-ACNC: ABNORMAL
LEUKOCYTE ESTERASE UR-ACNC: ABNORMAL
NITRITE UR-MCNC: NEGATIVE — SIGNIFICANT CHANGE UP
NITRITE UR-MCNC: NEGATIVE — SIGNIFICANT CHANGE UP
PH UR: 6 — SIGNIFICANT CHANGE UP (ref 5–8)
PH UR: 6 — SIGNIFICANT CHANGE UP (ref 5–8)
PROT UR-MCNC: NEGATIVE MG/DL — SIGNIFICANT CHANGE UP
PROT UR-MCNC: NEGATIVE MG/DL — SIGNIFICANT CHANGE UP
RBC CASTS # UR COMP ASSIST: 0 /HPF — SIGNIFICANT CHANGE UP (ref 0–4)
RBC CASTS # UR COMP ASSIST: 0 /HPF — SIGNIFICANT CHANGE UP (ref 0–4)
RSV RNA NPH QL NAA+NON-PROBE: SIGNIFICANT CHANGE UP
RSV RNA NPH QL NAA+NON-PROBE: SIGNIFICANT CHANGE UP
SARS-COV-2 RNA SPEC QL NAA+PROBE: SIGNIFICANT CHANGE UP
SARS-COV-2 RNA SPEC QL NAA+PROBE: SIGNIFICANT CHANGE UP
SP GR SPEC: 1.01 — SIGNIFICANT CHANGE UP (ref 1–1.03)
SP GR SPEC: 1.01 — SIGNIFICANT CHANGE UP (ref 1–1.03)
SQUAMOUS # UR AUTO: 0 /HPF — SIGNIFICANT CHANGE UP (ref 0–5)
SQUAMOUS # UR AUTO: 0 /HPF — SIGNIFICANT CHANGE UP (ref 0–5)
UROBILINOGEN FLD QL: 1 MG/DL — SIGNIFICANT CHANGE UP (ref 0.2–1)
UROBILINOGEN FLD QL: 1 MG/DL — SIGNIFICANT CHANGE UP (ref 0.2–1)
WBC UR QL: 1 /HPF — SIGNIFICANT CHANGE UP (ref 0–5)
WBC UR QL: 1 /HPF — SIGNIFICANT CHANGE UP (ref 0–5)

## 2024-01-12 PROCEDURE — 99285 EMERGENCY DEPT VISIT HI MDM: CPT

## 2024-01-12 PROCEDURE — 94640 AIRWAY INHALATION TREATMENT: CPT

## 2024-01-12 PROCEDURE — 71046 X-RAY EXAM CHEST 2 VIEWS: CPT

## 2024-01-12 PROCEDURE — 81001 URINALYSIS AUTO W/SCOPE: CPT

## 2024-01-12 PROCEDURE — 71046 X-RAY EXAM CHEST 2 VIEWS: CPT | Mod: 26

## 2024-01-12 PROCEDURE — 87637 SARSCOV2&INF A&B&RSV AMP PRB: CPT

## 2024-01-12 PROCEDURE — 99284 EMERGENCY DEPT VISIT MOD MDM: CPT | Mod: 25

## 2024-01-12 RX ORDER — IPRATROPIUM/ALBUTEROL SULFATE 18-103MCG
3 AEROSOL WITH ADAPTER (GRAM) INHALATION ONCE
Refills: 0 | Status: COMPLETED | OUTPATIENT
Start: 2024-01-12 | End: 2024-01-12

## 2024-01-12 RX ADMIN — Medication 3 MILLILITER(S): at 18:30

## 2024-01-12 RX ADMIN — Medication 3 MILLILITER(S): at 18:57

## 2024-01-12 RX ADMIN — Medication 50 MILLIGRAM(S): at 18:30

## 2024-01-12 NOTE — ED PROVIDER NOTE - NS ED ATTENDING NAME FT
Mike Solaraze Counseling:  I discussed with the patient the risks of Solaraze including but not limited to erythema, scaling, itching, weeping, crusting, and pain.

## 2024-01-12 NOTE — ED ADULT TRIAGE NOTE - CHIEF COMPLAINT QUOTE
Patient PMH DM, HTN and asthma to the ED c/o cough/congestion and generalized back pain with bodyaches x 4 days. Denies chest/abdominal pain. "I think I am having an asthma flare"- Speaking in complete sentences, AAOX4, NAD.

## 2024-01-12 NOTE — ED ADULT NURSE NOTE - CADM POA PRESS ULCER
[TextBox_4] : Ms. LEOS  is a 29 year old female with a history of ACL tear, allergies, Covid 19, eczema, who now comes to the office for a follow up pulmonary evaluation. Her chief complaint is\par \par -she notes feeling anxious\par -she notes feeling generally well \par -she notes intermittent headaches\par -she notes intermittent chest pressure when bending down\par -she notes vision is stable \par -she notes adequate sleep for the past week\par -she notes feeling fatigued after waking up\par -she notes snoring\par -she notes nasal congestion which has been recently exacerbated\par -she notes tolerating cold air mostly well\par -she notes eczema is unchanged\par \par -she denies any nausea, vomiting, fever, chills, sweats, chest pain, coughing, wheezing, palpitations, constipation, diarrhea, dizziness, dysphagia, heartburn, reflux, itchy eyes, itchy ears, leg swelling, leg pain, arthralgias, myalgias, or sour taste in the mouth.  No

## 2024-01-12 NOTE — ED PROVIDER NOTE - CLINICAL SUMMARY MEDICAL DECISION MAKING FREE TEXT BOX
Pt is afebrile and hemodynamically stable. No hypoxia on room air. She has expiratory wheezing. Improved after duoneb x2 and prednisone. Flu/covid neg. CXR without acute cardiopulmonary abnormality. UA without evidence of infection. Discussed all results with pt. Likely viral illness with asthma exacerbation. Will send rx for prednisone. Return precautions given.

## 2024-01-12 NOTE — ED PROVIDER NOTE - PATIENT PORTAL LINK FT
You can access the FollowMyHealth Patient Portal offered by [facility] by registering at the following website: [email or web address]. By joining [employer]’s FollowMyHealth portal, you will also be able to view your health information using other applications (apps) compatible with our system. You can access the FollowMyHealth Patient Portal offered by [facility] by registering at the following website: [email or web address]. By joining [employer]’s FollowMyHealth portal, you will also be able to view your health information using other applications (apps) compatible with our system.

## 2024-01-12 NOTE — ED ADULT NURSE NOTE - OBJECTIVE STATEMENT
Pt hx asthma presents to ED c/o cold/flu sx, cough, chills, chest tightness. Denies CP or SOB, palpitations, fever, dizziness, abd pain, urinary sx. RR even and unlabored. speaking in full sentences. a&ox4.

## 2024-01-12 NOTE — ED ADULT NURSE NOTE - NSFALLUNIVINTERV_ED_ALL_ED
Bed/Stretcher in lowest position, wheels locked, appropriate side rails in place/Call bell, personal items and telephone in reach/Instruct patient to call for assistance before getting out of bed/chair/stretcher/Non-slip footwear applied when patient is off stretcher/[place] to call system/Physically safe environment - no spills, clutter or unnecessary equipment/Purposeful proactive rounding/Room/bathroom lighting operational, light cord in reach Bed/Stretcher in lowest position, wheels locked, appropriate side rails in place/Call bell, personal items and telephone in reach/Instruct patient to call for assistance before getting out of bed/chair/stretcher/Non-slip footwear applied when patient is off stretcher/[place] to call system/Physically safe environment - no spills, clutter or unnecessary equipment/Purposeful proactive rounding/Room/bathroom lighting operational, light cord in reach

## 2024-01-12 NOTE — ED PROVIDER NOTE - NSFOLLOWUPINSTRUCTIONS_ED_ALL_ED_FT
Your labs and chest xray were reassuring today.    You received a dose of steroid in the Emergency Department. Continue two tabs of prednisone daily for 4 days.    Use 2 puffs of albuterol 4 to 6 times daily for wheezing.    Follow up with your primary care doctor or pulmonologist.    Return to the Emergency Department if you develop fever>100.4F, shortness of breath, chest pain, or any other concerns.

## 2024-01-12 NOTE — ED PROVIDER NOTE - OBJECTIVE STATEMENT
62yo female with pmhx of asthma presents with 2wk nasal congestion and dry cough. Pt reports cough began with production of sputum, now dry. She has been using home albuterol mdi without significant relief. Came to ED today because of persistent symptoms. She denies fever, sore throat, chest pain, shortness of breath, abdominal pain. 64yo female with pmhx of asthma presents with 2wk nasal congestion and dry cough. Pt reports cough began with production of sputum, now dry. She has been using home albuterol mdi without significant relief. Came to ED today because of persistent symptoms. She denies fever, sore throat, chest pain, shortness of breath, abdominal pain.

## 2024-01-12 NOTE — ED PROVIDER NOTE - PHYSICAL EXAMINATION
VITAL SIGNS: I have reviewed nursing notes and confirm.  CONSTITUTIONAL: Well-developed; in no acute distress.   SKIN:  warm and dry, no acute rash.   HEAD:  normocephalic, atraumatic.  EYES: PERRL, EOM intact; conjunctiva and sclera clear.  ENT: Clear nasal discharge; airway clear. TMs clear without erythema or edema. Posterior oropharynx non-erythematous, no tonsillar edema or exudates. Uvula midline. Managing secretions appropriately.   NECK: Supple; non tender.  CARD: S1, S2 normal; no murmurs, gallops, or rubs. Regular rate and rhythm.   RESP:  Expiratory wheezing. No increased respiratory effort.   ABD: Normal bowel sounds; soft; non-distended; non-tender; no guarding/ rebound.  EXT: Normal ROM. No clubbing, cyanosis or edema. 2+ pulses to b/l ue/le.  NEURO: Alert, oriented, grossly unremarkable  PSYCH: Cooperative, mood and affect appropriate.

## 2024-04-04 ENCOUNTER — EMERGENCY (EMERGENCY)
Facility: HOSPITAL | Age: 64
LOS: 1 days | Discharge: HOME | End: 2024-04-04
Attending: EMERGENCY MEDICINE | Admitting: EMERGENCY MEDICINE
Payer: COMMERCIAL

## 2024-04-04 VITALS
RESPIRATION RATE: 18 BRPM | DIASTOLIC BLOOD PRESSURE: 83 MMHG | HEIGHT: 59 IN | HEART RATE: 99 BPM | WEIGHT: 158.73 LBS | TEMPERATURE: 98 F | SYSTOLIC BLOOD PRESSURE: 136 MMHG | OXYGEN SATURATION: 97 %

## 2024-04-04 LAB
FLUAV AG NPH QL: SIGNIFICANT CHANGE UP
FLUBV AG NPH QL: SIGNIFICANT CHANGE UP
RSV RNA NPH QL NAA+NON-PROBE: SIGNIFICANT CHANGE UP
SARS-COV-2 RNA SPEC QL NAA+PROBE: SIGNIFICANT CHANGE UP

## 2024-04-04 PROCEDURE — 94640 AIRWAY INHALATION TREATMENT: CPT

## 2024-04-04 PROCEDURE — 71046 X-RAY EXAM CHEST 2 VIEWS: CPT | Mod: 26

## 2024-04-04 PROCEDURE — 99284 EMERGENCY DEPT VISIT MOD MDM: CPT

## 2024-04-04 PROCEDURE — 87637 SARSCOV2&INF A&B&RSV AMP PRB: CPT

## 2024-04-04 PROCEDURE — 99284 EMERGENCY DEPT VISIT MOD MDM: CPT | Mod: 25

## 2024-04-04 PROCEDURE — 71046 X-RAY EXAM CHEST 2 VIEWS: CPT

## 2024-04-04 RX ORDER — IPRATROPIUM/ALBUTEROL SULFATE 18-103MCG
3 AEROSOL WITH ADAPTER (GRAM) INHALATION ONCE
Refills: 0 | Status: COMPLETED | OUTPATIENT
Start: 2024-04-04 | End: 2024-04-04

## 2024-04-04 RX ORDER — AZITHROMYCIN 500 MG/1
1 TABLET, FILM COATED ORAL
Qty: 1 | Refills: 0
Start: 2024-04-04 | End: 2024-04-08

## 2024-04-04 RX ADMIN — Medication 3 MILLILITER(S): at 21:12

## 2024-04-04 RX ADMIN — Medication 40 MILLIGRAM(S): at 21:12

## 2024-04-04 RX ADMIN — Medication 3 MILLILITER(S): at 21:51

## 2024-04-04 NOTE — ED PROVIDER NOTE - NSFOLLOWUPINSTRUCTIONS_ED_ALL_ED_FT
Please see your primary care provider for followup.  Call for appointment.  If you have any problems with followup, please call the ED Referral Coordinator at [phone].  Return to the ER if symptoms worsen or other concerns.    Acute Bronchitis, Adult  A comparison between normal and swollen airways, or bronchi, in the lungs.  Acute bronchitis is sudden inflammation of the main airways (bronchi) that come off the windpipe (trachea) in the lungs. The swelling causes the airways to get smaller and make more mucus than normal. This can make it hard to breathe and can cause coughing or noisy breathing (wheezing).    Acute bronchitis may last several weeks. The cough may last longer. Allergies, asthma, and exposure to smoke may make the condition worse.    What are the causes?  This condition can be caused by germs and by substances that irritate the lungs, including:  Cold and flu viruses. The most common cause of this condition is the virus that causes the common cold.  Bacteria. This is less common.  Breathing in substances that irritate the lungs, including:  Smoke from cigarettes and other forms of tobacco.  Dust and pollen.  Fumes from household cleaning products, gases, or burned fuel.  Indoor or outdoor air pollution.  What increases the risk?  The following factors may make you more likely to develop this condition:  A weak body's defense system, also called the immune system.  A condition that affects your lungs and breathing, such as asthma.  What are the signs or symptoms?  Common symptoms of this condition include:  Coughing. This may bring up clear, yellow, or green mucus from your lungs (sputum).  Wheezing.  Runny or stuffy nose.  Having too much mucus in your lungs (chest congestion).  Shortness of breath.  Aches and pains, including sore throat or chest.  How is this diagnosed?  This condition is usually diagnosed based on:  Your symptoms and medical history.  A physical exam.  You may also have other tests, including tests to rule out other conditions, such as pneumonia. These tests include:  A test of lung function.  Test of a mucus sample to look for the presence of bacteria.  Tests to check the oxygen level in your blood.  Blood tests.  Chest X-ray.  How is this treated?  Most cases of acute bronchitis clear up over time without treatment. Your health care provider may recommend:  Drinking more fluids to help thin your mucus so it is easier to cough up.  Taking inhaled medicine (inhaler) to improve air flow in and out of your lungs.  Using a vaporizer or a humidifier. These are machines that add water to the air to help you breathe better.  Taking a medicine that thins mucus and clears congestion (expectorant).  Taking a medicine that prevents or stops coughing (cough suppressant).  It is not common to take an antibiotic medicine for this condition.    Follow these instructions at home:  A do not smoke cigarettes sign.  Take over-the-counter and prescription medicines only as told by your health care provider.  Use an inhaler, vaporizer, or humidifier as told by your health care provider.  Take two teaspoons (10 mL) of honey at bedtime to lessen coughing at night.  Drink enough fluid to keep your urine pale yellow.  Do not use any products that contain nicotine or tobacco. These products include cigarettes, chewing tobacco, and vaping devices, such as e-cigarettes. If you need help quitting, ask your health care provider.  Get plenty of rest.  Return to your normal activities as told by your health care provider. Ask your health care provider what activities are safe for you.  Keep all follow-up visits. This is important.  How is this prevented?  Washing hands with soap and water.  To lower your risk of getting this condition again:  Wash your hands often with soap and water for at least 20 seconds. If soap and water are not available, use hand .  Avoid contact with people who have cold symptoms.  Try not to touch your mouth, nose, or eyes with your hands.  Avoid breathing in smoke or chemical fumes. Breathing smoke or chemical fumes will make your condition worse.  Get the flu shot every year.  Contact a health care provider if:  Your symptoms do not improve after 2 weeks.  You have trouble coughing up the mucus.  Your cough keeps you awake at night.  You have a fever.  Get help right away if you:  Cough up blood.  Feel pain in your chest.  Have severe shortness of breath.  Faint or keep feeling like you are going to faint.  Have a severe headache.  Have a fever or chills that get worse.  These symptoms may represent a serious problem that is an emergency. Do not wait to see if the symptoms will go away. Get medical help right away. Call your local emergency services (911 in the [place] Do not drive yourself to the hospital.    Summary  Acute bronchitis is inflammation of the main airways (bronchi) that come off the windpipe (trachea) in the lungs. The swelling causes the airways to get smaller and make more mucus than normal.  Drinking more fluids can help thin your mucus so it is easier to cough up.  Take over-the-counter and prescription medicines only as told by your health care provider.  Do not use any products that contain nicotine or tobacco. These products include cigarettes, chewing tobacco, and vaping devices, such as e-cigarettes. If you need help quitting, ask your health care provider.  Contact a health care provider if your symptoms do not improve after 2 weeks.  This information is not intended to replace advice given to you by your health care provider. Make sure you discuss any questions you have with your health care provider.

## 2024-04-04 NOTE — ED PROVIDER NOTE - RESPIRATORY, MLM
Breath sounds equal bilaterally. expiratory wheeze/ bronchospasm with coughing. no crackles. normal work of breathing

## 2024-04-04 NOTE — ED PROVIDER NOTE - CLINICAL SUMMARY MEDICAL DECISION MAKING FREE TEXT BOX
cough x 5 days w bronchospasm/wheezing. asthma/smoker. vitals stable/ well appearing. cxr done to r/o infiltrate and neg. given duoneb x2 , prednisone w improvement. flu/covid sent. rx for zpak (smoker) and prednisone. has inhaler at home. return precautions discussed

## 2024-04-04 NOTE — ED PROVIDER NOTE - OBJECTIVE STATEMENT
history of asthma, dm, htn, smoking, here with cough x 5 days. Associated intermittent brown/yellow/clear phlegm. No fever, chills, chest pain, sore throat, body aches. Mild sob. Tried otc meds without relief.

## 2024-04-04 NOTE — ED ADULT TRIAGE NOTE - CHIEF COMPLAINT QUOTE
Pt, with hx of asthma, HTN, HLD, and DM, presents to ER c/o productive cough "brown/gray" sputum without relief from inhaler or OTC medications for ~5 days. Pt denies any other associated symptoms at this time.

## 2024-04-04 NOTE — ED PROVIDER NOTE - PATIENT PORTAL LINK FT
You can access the FollowMyHealth Patient Portal offered by [facility] by registering at the following website: [email or web address]. By joining [employer]’s FollowMyHealth portal, you will also be able to view your health information using other applications (apps) compatible with our system.

## 2024-04-08 DIAGNOSIS — F17.200 NICOTINE DEPENDENCE, UNSPECIFIED, UNCOMPLICATED: ICD-10-CM

## 2024-04-08 DIAGNOSIS — I10 ESSENTIAL (PRIMARY) HYPERTENSION: ICD-10-CM

## 2024-04-08 DIAGNOSIS — J45.909 UNSPECIFIED ASTHMA, UNCOMPLICATED: ICD-10-CM

## 2024-04-08 DIAGNOSIS — R05.9 COUGH, UNSPECIFIED: ICD-10-CM

## 2024-04-08 DIAGNOSIS — E11.9 TYPE 2 DIABETES MELLITUS WITHOUT COMPLICATIONS: ICD-10-CM

## 2024-04-08 DIAGNOSIS — Z20.822 CONTACT WITH AND (SUSPECTED) EXPOSURE TO COVID-19: ICD-10-CM

## 2024-05-31 ENCOUNTER — EMERGENCY (EMERGENCY)
Facility: HOSPITAL | Age: 64
LOS: 1 days | Discharge: HOME | End: 2024-05-31
Attending: EMERGENCY MEDICINE | Admitting: EMERGENCY MEDICINE
Payer: MEDICAID

## 2024-05-31 VITALS
DIASTOLIC BLOOD PRESSURE: 70 MMHG | OXYGEN SATURATION: 96 % | TEMPERATURE: 98 F | HEART RATE: 82 BPM | RESPIRATION RATE: 18 BRPM | SYSTOLIC BLOOD PRESSURE: 104 MMHG

## 2024-05-31 VITALS
SYSTOLIC BLOOD PRESSURE: 101 MMHG | DIASTOLIC BLOOD PRESSURE: 67 MMHG | HEART RATE: 96 BPM | WEIGHT: 160.06 LBS | TEMPERATURE: 98 F | HEIGHT: 59 IN | RESPIRATION RATE: 18 BRPM | OXYGEN SATURATION: 96 %

## 2024-05-31 PROCEDURE — 99283 EMERGENCY DEPT VISIT LOW MDM: CPT

## 2024-05-31 PROCEDURE — 99284 EMERGENCY DEPT VISIT MOD MDM: CPT

## 2024-05-31 RX ORDER — OFLOXACIN 0.3 %
2 DROPS OPHTHALMIC (EYE)
Qty: 1 | Refills: 0
Start: 2024-05-31 | End: 2024-06-04

## 2024-05-31 NOTE — ED ADULT TRIAGE NOTE - CHIEF COMPLAINT QUOTE
Pt c/o R eye pain after moving mat and feeling like something went into it. C/o drainage. Denies visual changes.

## 2024-05-31 NOTE — ED PROVIDER NOTE - CLINICAL SUMMARY MEDICAL DECISION MAKING FREE TEXT BOX
63F PMH asthma, DM, HTN p/w eye pain. ~0900 pt was taking a floor mat out of the dryer and an edge of it hit her in the right eye. Since then she has pain to R eye, FB sensation, tearing.   Vitals wnl, exam as above.  ddx: Corneal abrasion.   Discussed importance of outpt follow up and return precautions. Clinically no indication for further emergent ED workup or hospitalization at this time. Stable for dc, outpt f/u.

## 2024-05-31 NOTE — ED PROVIDER NOTE - NSFOLLOWUPINSTRUCTIONS_ED_ALL_ED_FT
Take eye drop as prescribed (2 drops 4 times a day for 5 days).    Can take tylenol 650mg every 6hrs as needed for pain.    Return for fevers, persistent vomit, uncontrolled pain, worsening breathing, worsening lightheaded, spreading redness, worsening vision changes.    Follow up with primary doctor within 1-2 days.     Follow up with eye doctor. Can call [facility] ([facility]) at [phone] to schedule appointment.     Corneal Abrasion    The cornea is the clear covering at the front and center of the eye. This very thin tissue is made up of many layers. If a scratch or injury causes the corneal epithelium to come off, it is called a corneal abrasion. Symptoms include eye pain, redness, tearing, difficulty keeping eye open, and light sensitivity. Do not drive or operate machinery if your eye is patched.  Antibiotic eye drops may be prescribed to reduce the risk of infection.  It is important to follow up with an ophthalmologist (eye doctor) to ensure proper healing.    SEEK IMMEDIATE MEDICAL CARE IF YOU HAVE ANY OF THE FOLLOWING SYMPTOMS: discharge from eyes, changes in vision, fever, or swelling.

## 2024-05-31 NOTE — ED ADULT NURSE NOTE - OBJECTIVE STATEMENT
Pain to right eye w/ redness, tearing and photophobia, states hit/scratched right eye while doing laundry, w/ mild blurring of vision.

## 2024-05-31 NOTE — ED ADULT NURSE NOTE - CHIEF COMPLAINT
Significant pitting edema noted on exam. Less likely cellulitis. Wrapped in ace bandage. No purulence, no discoloration, no lymphangitic type spread, nothing able to be expectorated from surgical site. Lower extremity where pain was (patient no longer in pain),   -will follow up X-rays foot/ankle  -ER ordered vancomycin, recommended stopping.   - The patient is a 63y Female complaining of eye pain/injury. Significant pitting edema noted on exam. Less likely cellulitis. Wrapped in ace bandage. No purulence, no discoloration, no lymphangitic type spread, nothing able to be expectorated from surgical site. Lower extremity where pain was (patient no longer in pain),   -will follow up X-rays foot/ankle  -ER ordered vancomycin, recommended stopping.   -NO antibiotics for now. C/w Ace wrap, and follow up Orthopedics.

## 2024-05-31 NOTE — ED PROVIDER NOTE - PHYSICAL EXAMINATION
+clear tearing from right eye.   Normal conjunctiva/sclera  PERRL, EOMI, no nystagmus.  No proptosis. No chemosis. No hemorrhage.   Instilled 1 drop tetracaine --> symptoms completely resolved and now asymptomatic --> flouroscein --> punctate corneal abrasion overlying superior aspect of pupil.   no FB, eyelid everted.   Acuity 20/40 R eye, 20/40 left eye    Physical Exam:    CONSTITUTIONAL:  Generally well appearing, no acute distress, alert, awake and oriented  HEENT:  Moist mucous membranes, normal voice  PULM:  No accessory muscle use, speaking full sentences  SKIN:  Normal in appearance, normal color

## 2024-05-31 NOTE — ED PROVIDER NOTE - OBJECTIVE STATEMENT
63F PMH asthma, DM, HTN p/w eye pain. ~0900 pt was taking a floor mat out of the dryer and an edge of it hit her in the right eye. Since then she has pain to R eye, FB sensation, tearing.   Denies other injuries, HA, vision changes, NV. Uses reading glasses, no contacts.

## 2024-05-31 NOTE — ED PROVIDER NOTE - PATIENT PORTAL LINK FT
You can access the FollowMyHealth Patient Portal offered by [facility] by registering at the following website: [email or web address]. By joining [facility]’s FollowMyHealth portal, you will also be able to view your health information using other applications (apps) compatible with our system.

## 2024-06-03 DIAGNOSIS — I10 ESSENTIAL (PRIMARY) HYPERTENSION: ICD-10-CM

## 2024-06-03 DIAGNOSIS — E11.9 TYPE 2 DIABETES MELLITUS WITHOUT COMPLICATIONS: ICD-10-CM
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
## 2024-06-03 DIAGNOSIS — H57.11 OCULAR PAIN, RIGHT EYE: ICD-10-CM

## 2024-06-03 DIAGNOSIS — S05.01XA INJURY OF CONJUNCTIVA AND CORNEAL ABRASION WITHOUT FOREIGN BODY, RIGHT EYE, INITIAL ENCOUNTER: ICD-10-CM

## 2024-06-03 DIAGNOSIS — J45.909 UNSPECIFIED ASTHMA, UNCOMPLICATED: ICD-10-CM
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
## 2024-06-06 ENCOUNTER — APPOINTMENT (OUTPATIENT)
Dept: HEART AND VASCULAR | Facility: CLINIC | Age: 64
End: 2024-06-06
Payer: MEDICAID

## 2024-06-06 VITALS
BODY MASS INDEX: 30.24 KG/M2 | WEIGHT: 150 LBS | SYSTOLIC BLOOD PRESSURE: 122 MMHG | HEIGHT: 59 IN | OXYGEN SATURATION: 92 % | HEART RATE: 94 BPM | TEMPERATURE: 98.2 F | DIASTOLIC BLOOD PRESSURE: 68 MMHG

## 2024-06-06 DIAGNOSIS — I42.1 OBSTRUCTIVE HYPERTROPHIC CARDIOMYOPATHY: ICD-10-CM

## 2024-06-06 DIAGNOSIS — I10 ESSENTIAL (PRIMARY) HYPERTENSION: ICD-10-CM

## 2024-06-06 DIAGNOSIS — R73.03 PREDIABETES.: ICD-10-CM

## 2024-06-06 DIAGNOSIS — E78.5 HYPERLIPIDEMIA, UNSPECIFIED: ICD-10-CM

## 2024-06-06 DIAGNOSIS — F17.200 NICOTINE DEPENDENCE, UNSPECIFIED, UNCOMPLICATED: ICD-10-CM

## 2024-06-06 PROCEDURE — 99214 OFFICE O/P EST MOD 30 MIN: CPT

## 2024-06-06 NOTE — PHYSICAL EXAM

## 2024-06-06 NOTE — REASON FOR VISIT
[FreeTextEntry1] : 64 y/o with DM, chronic smoker and untreated HLD with a ,  in 2018. A1c of 7.7.  No recent labs in chart.  She continues to smoke 1 PPD despite moderate COPD as delineated by Dr Santo.  EKG: NSR, normal axis and intervals, no ST-Tw abnormalities.   7/31/23  Smoking much less, echo today with [facility] (17/37) 6/6/24

## 2024-06-06 NOTE — ASSESSMENT
[FreeTextEntry1] : HTN- controlled  IHSS- resting gradient of 17 mm and 36 with Valsalva.  DC HCTZ and start Toprol 25 mg.  HLD- not on statin, Atorvastatin 40 mg ordered  DM- diet reviewed  Tobacco- discussed in detail, options noted  High risk for CAD- Coronary Calcium Score ordered  Tachycardia- Sinus Tach, echo

## 2024-07-11 ENCOUNTER — INPATIENT (INPATIENT)
Facility: HOSPITAL | Age: 64
LOS: 3 days | Discharge: HOME | End: 2024-07-15
Attending: INTERNAL MEDICINE | Admitting: STUDENT IN AN ORGANIZED HEALTH CARE EDUCATION/TRAINING PROGRAM
Payer: MEDICAID

## 2024-07-11 VITALS
HEART RATE: 120 BPM | SYSTOLIC BLOOD PRESSURE: 159 MMHG | TEMPERATURE: 99 F | OXYGEN SATURATION: 95 % | WEIGHT: 160.06 LBS | DIASTOLIC BLOOD PRESSURE: 84 MMHG | HEIGHT: 59 IN | RESPIRATION RATE: 20 BRPM

## 2024-07-11 DIAGNOSIS — B34.8 OTHER VIRAL INFECTIONS OF UNSPECIFIED SITE: ICD-10-CM

## 2024-07-11 DIAGNOSIS — E78.5 HYPERLIPIDEMIA, UNSPECIFIED: ICD-10-CM

## 2024-07-11 DIAGNOSIS — M54.9 DORSALGIA, UNSPECIFIED: ICD-10-CM

## 2024-07-11 DIAGNOSIS — I10 ESSENTIAL (PRIMARY) HYPERTENSION: ICD-10-CM

## 2024-07-11 DIAGNOSIS — K59.00 CONSTIPATION, UNSPECIFIED: ICD-10-CM

## 2024-07-11 DIAGNOSIS — F17.200 NICOTINE DEPENDENCE, UNSPECIFIED, UNCOMPLICATED: ICD-10-CM

## 2024-07-11 DIAGNOSIS — J44.1 CHRONIC OBSTRUCTIVE PULMONARY DISEASE WITH (ACUTE) EXACERBATION: ICD-10-CM

## 2024-07-11 DIAGNOSIS — I42.1 OBSTRUCTIVE HYPERTROPHIC CARDIOMYOPATHY: ICD-10-CM

## 2024-07-11 DIAGNOSIS — E11.9 TYPE 2 DIABETES MELLITUS WITHOUT COMPLICATIONS: ICD-10-CM

## 2024-07-11 DIAGNOSIS — Z29.9 ENCOUNTER FOR PROPHYLACTIC MEASURES, UNSPECIFIED: ICD-10-CM

## 2024-07-11 LAB
A1C WITH ESTIMATED AVERAGE GLUCOSE RESULT: 7.6 % — HIGH (ref 4–5.6)
ADD ON TEST-SPECIMEN IN LAB: SIGNIFICANT CHANGE UP
ADD ON TEST-SPECIMEN IN LAB: SIGNIFICANT CHANGE UP
ANION GAP SERPL CALC-SCNC: 12 MMOL/L — SIGNIFICANT CHANGE UP (ref 5–17)
BASOPHILS # BLD AUTO: 0.04 K/UL — SIGNIFICANT CHANGE UP (ref 0–0.2)
BASOPHILS NFR BLD AUTO: 0.6 % — SIGNIFICANT CHANGE UP (ref 0–2)
BUN SERPL-MCNC: 7 MG/DL — SIGNIFICANT CHANGE UP (ref 7–23)
CALCIUM SERPL-MCNC: 9.6 MG/DL — SIGNIFICANT CHANGE UP (ref 8.4–10.5)
CHLORIDE SERPL-SCNC: 100 MMOL/L — SIGNIFICANT CHANGE UP (ref 96–108)
CO2 SERPL-SCNC: 24 MMOL/L — SIGNIFICANT CHANGE UP (ref 22–31)
CREAT SERPL-MCNC: 0.73 MG/DL — SIGNIFICANT CHANGE UP (ref 0.5–1.3)
EGFR: 94 ML/MIN/1.73M2 — SIGNIFICANT CHANGE UP
EOSINOPHIL # BLD AUTO: 0.02 K/UL — SIGNIFICANT CHANGE UP (ref 0–0.5)
EOSINOPHIL NFR BLD AUTO: 0.3 % — SIGNIFICANT CHANGE UP (ref 0–6)
ESTIMATED AVERAGE GLUCOSE: 171 MG/DL — HIGH (ref 68–114)
FLUAV AG NPH QL: SIGNIFICANT CHANGE UP
FLUBV AG NPH QL: SIGNIFICANT CHANGE UP
GLUCOSE BLDC GLUCOMTR-MCNC: 197 MG/DL — HIGH (ref 70–99)
GLUCOSE SERPL-MCNC: 237 MG/DL — HIGH (ref 70–99)
HCT VFR BLD CALC: 41.1 % — SIGNIFICANT CHANGE UP (ref 34.5–45)
HGB BLD-MCNC: 13 G/DL — SIGNIFICANT CHANGE UP (ref 11.5–15.5)
IMM GRANULOCYTES NFR BLD AUTO: 0.3 % — SIGNIFICANT CHANGE UP (ref 0–0.9)
LYMPHOCYTES # BLD AUTO: 1.07 K/UL — SIGNIFICANT CHANGE UP (ref 1–3.3)
LYMPHOCYTES # BLD AUTO: 15.6 % — SIGNIFICANT CHANGE UP (ref 13–44)
MCHC RBC-ENTMCNC: 26.8 PG — LOW (ref 27–34)
MCHC RBC-ENTMCNC: 31.6 GM/DL — LOW (ref 32–36)
MCV RBC AUTO: 84.7 FL — SIGNIFICANT CHANGE UP (ref 80–100)
MONOCYTES # BLD AUTO: 0.8 K/UL — SIGNIFICANT CHANGE UP (ref 0–0.9)
MONOCYTES NFR BLD AUTO: 11.6 % — SIGNIFICANT CHANGE UP (ref 2–14)
NEUTROPHILS # BLD AUTO: 4.93 K/UL — SIGNIFICANT CHANGE UP (ref 1.8–7.4)
NEUTROPHILS NFR BLD AUTO: 71.6 % — SIGNIFICANT CHANGE UP (ref 43–77)
NRBC # BLD: 0 /100 WBCS — SIGNIFICANT CHANGE UP (ref 0–0)
PLATELET # BLD AUTO: 257 K/UL — SIGNIFICANT CHANGE UP (ref 150–400)
POTASSIUM SERPL-MCNC: 3.8 MMOL/L — SIGNIFICANT CHANGE UP (ref 3.5–5.3)
POTASSIUM SERPL-MCNC: SIGNIFICANT CHANGE UP (ref 3.5–5.3)
POTASSIUM SERPL-MCNC: SIGNIFICANT CHANGE UP MMOL/L (ref 3.5–5.3)
POTASSIUM SERPL-SCNC: 3.8 MMOL/L — SIGNIFICANT CHANGE UP (ref 3.5–5.3)
POTASSIUM SERPL-SCNC: SIGNIFICANT CHANGE UP (ref 3.5–5.3)
POTASSIUM SERPL-SCNC: SIGNIFICANT CHANGE UP MMOL/L (ref 3.5–5.3)
RBC # BLD: 4.85 M/UL — SIGNIFICANT CHANGE UP (ref 3.8–5.2)
RBC # FLD: 14.9 % — HIGH (ref 10.3–14.5)
RSV RNA NPH QL NAA+NON-PROBE: SIGNIFICANT CHANGE UP
SARS-COV-2 RNA SPEC QL NAA+PROBE: SIGNIFICANT CHANGE UP
SODIUM SERPL-SCNC: 136 MMOL/L — SIGNIFICANT CHANGE UP (ref 135–145)
WBC # BLD: 6.88 K/UL — SIGNIFICANT CHANGE UP (ref 3.8–10.5)
WBC # FLD AUTO: 6.88 K/UL — SIGNIFICANT CHANGE UP (ref 3.8–10.5)

## 2024-07-11 PROCEDURE — 71045 X-RAY EXAM CHEST 1 VIEW: CPT | Mod: 26

## 2024-07-11 PROCEDURE — 99223 1ST HOSP IP/OBS HIGH 75: CPT | Mod: GC

## 2024-07-11 PROCEDURE — 99285 EMERGENCY DEPT VISIT HI MDM: CPT

## 2024-07-11 RX ORDER — INSULIN LISPRO 100 [IU]/ML
INJECTION, SOLUTION SUBCUTANEOUS
Refills: 0 | Status: DISCONTINUED | OUTPATIENT
Start: 2024-07-11 | End: 2024-07-15

## 2024-07-11 RX ORDER — METHYLPREDNISOLONE ACETATE 20 MG/ML
125 VIAL (ML) INJECTION ONCE
Refills: 0 | Status: COMPLETED | OUTPATIENT
Start: 2024-07-11 | End: 2024-07-11

## 2024-07-11 RX ORDER — LOSARTAN POTASSIUM 100 MG/1
25 TABLET, FILM COATED ORAL DAILY
Refills: 0 | Status: DISCONTINUED | OUTPATIENT
Start: 2024-07-11 | End: 2024-07-15

## 2024-07-11 RX ORDER — METOPROLOL TARTRATE 50 MG
25 TABLET ORAL DAILY
Refills: 0 | Status: DISCONTINUED | OUTPATIENT
Start: 2024-07-11 | End: 2024-07-15

## 2024-07-11 RX ORDER — MAGNESIUM SULFATE 100 %
2 POWDER (GRAM) MISCELLANEOUS ONCE
Refills: 0 | Status: COMPLETED | OUTPATIENT
Start: 2024-07-11 | End: 2024-07-11

## 2024-07-11 RX ORDER — LINAGLIPTIN 5 MG/1
5 TABLET, FILM COATED ORAL DAILY
Refills: 0 | Status: DISCONTINUED | OUTPATIENT
Start: 2024-07-11 | End: 2024-07-15

## 2024-07-11 RX ORDER — ALBUTEROL 90 MCG
0 AEROSOL REFILL (GRAM) INHALATION
Refills: 0 | DISCHARGE

## 2024-07-11 RX ORDER — IPRATROPIUM BROMIDE AND ALBUTEROL SULFATE .5; 3 MG/3ML; MG/3ML
3 SOLUTION RESPIRATORY (INHALATION)
Refills: 0 | Status: COMPLETED | OUTPATIENT
Start: 2024-07-11 | End: 2024-07-11

## 2024-07-11 RX ORDER — IPRATROPIUM BROMIDE AND ALBUTEROL SULFATE .5; 3 MG/3ML; MG/3ML
3 SOLUTION RESPIRATORY (INHALATION) ONCE
Refills: 0 | Status: COMPLETED | OUTPATIENT
Start: 2024-07-11 | End: 2024-07-11

## 2024-07-11 RX ORDER — GUAIFENESIN 100 %
600 POWDER (GRAM) MISCELLANEOUS EVERY 12 HOURS
Refills: 0 | Status: DISCONTINUED | OUTPATIENT
Start: 2024-07-11 | End: 2024-07-15

## 2024-07-11 RX ORDER — METFORMIN HYDROCHLORIDE 850 MG/1
2 TABLET, FILM COATED ORAL
Refills: 0 | DISCHARGE

## 2024-07-11 RX ORDER — POLYETHYLENE GLYCOL 3350 1 G/G
17 POWDER ORAL DAILY
Refills: 0 | Status: DISCONTINUED | OUTPATIENT
Start: 2024-07-11 | End: 2024-07-15

## 2024-07-11 RX ORDER — CALCIUM CARBONATE/VITAMIN D2 250 MG-125
1 TABLET ORAL DAILY
Refills: 0 | Status: DISCONTINUED | OUTPATIENT
Start: 2024-07-11 | End: 2024-07-15

## 2024-07-11 RX ORDER — EMPAGLIFLOZIN 25 MG/1
1 TABLET, FILM COATED ORAL
Refills: 0 | DISCHARGE

## 2024-07-11 RX ORDER — ALBUTEROL 90 MCG
2 AEROSOL REFILL (GRAM) INHALATION
Refills: 0 | DISCHARGE

## 2024-07-11 RX ORDER — IPRATROPIUM BROMIDE AND ALBUTEROL SULFATE .5; 3 MG/3ML; MG/3ML
3 SOLUTION RESPIRATORY (INHALATION) EVERY 4 HOURS
Refills: 0 | Status: DISCONTINUED | OUTPATIENT
Start: 2024-07-11 | End: 2024-07-15

## 2024-07-11 RX ORDER — ATORVASTATIN CALCIUM 20 MG/1
40 TABLET, FILM COATED ORAL AT BEDTIME
Refills: 0 | Status: DISCONTINUED | OUTPATIENT
Start: 2024-07-11 | End: 2024-07-15

## 2024-07-11 RX ORDER — ACETAMINOPHEN 325 MG
1000 TABLET ORAL ONCE
Refills: 0 | Status: COMPLETED | OUTPATIENT
Start: 2024-07-11 | End: 2024-07-11

## 2024-07-11 RX ORDER — SENNOSIDES 8.6 MG
1 TABLET ORAL DAILY
Refills: 0 | Status: DISCONTINUED | OUTPATIENT
Start: 2024-07-11 | End: 2024-07-11

## 2024-07-11 RX ORDER — SENNOSIDES 8.6 MG
2 TABLET ORAL AT BEDTIME
Refills: 0 | Status: DISCONTINUED | OUTPATIENT
Start: 2024-07-11 | End: 2024-07-15

## 2024-07-11 RX ORDER — ENOXAPARIN SODIUM 100 MG/ML
40 INJECTION SUBCUTANEOUS EVERY 24 HOURS
Refills: 0 | Status: DISCONTINUED | OUTPATIENT
Start: 2024-07-11 | End: 2024-07-15

## 2024-07-11 RX ORDER — SODIUM CHLORIDE 0.9 % (FLUSH) 0.9 %
1000 SYRINGE (ML) INJECTION ONCE
Refills: 0 | Status: COMPLETED | OUTPATIENT
Start: 2024-07-11 | End: 2024-07-11

## 2024-07-11 RX ORDER — PREDNISONE 10 MG/1
40 TABLET ORAL DAILY
Refills: 0 | Status: COMPLETED | OUTPATIENT
Start: 2024-07-12 | End: 2024-07-15

## 2024-07-11 RX ADMIN — ENOXAPARIN SODIUM 40 MILLIGRAM(S): 100 INJECTION SUBCUTANEOUS at 15:21

## 2024-07-11 RX ADMIN — ATORVASTATIN CALCIUM 40 MILLIGRAM(S): 20 TABLET, FILM COATED ORAL at 21:44

## 2024-07-11 RX ADMIN — IPRATROPIUM BROMIDE AND ALBUTEROL SULFATE 3 MILLILITER(S): .5; 3 SOLUTION RESPIRATORY (INHALATION) at 19:30

## 2024-07-11 RX ADMIN — IPRATROPIUM BROMIDE AND ALBUTEROL SULFATE 3 MILLILITER(S): .5; 3 SOLUTION RESPIRATORY (INHALATION) at 06:27

## 2024-07-11 RX ADMIN — Medication 1000 MILLILITER(S): at 08:33

## 2024-07-11 RX ADMIN — Medication 600 MILLIGRAM(S): at 22:48

## 2024-07-11 RX ADMIN — Medication 2 TABLET(S): at 21:44

## 2024-07-11 RX ADMIN — Medication 1000 MILLIGRAM(S): at 08:33

## 2024-07-11 RX ADMIN — Medication 150 GRAM(S): at 06:27

## 2024-07-11 RX ADMIN — IPRATROPIUM BROMIDE AND ALBUTEROL SULFATE 3 MILLILITER(S): .5; 3 SOLUTION RESPIRATORY (INHALATION) at 06:42

## 2024-07-11 RX ADMIN — Medication 125 MILLIGRAM(S): at 06:27

## 2024-07-11 RX ADMIN — Medication 1 TABLET(S): at 21:44

## 2024-07-11 RX ADMIN — IPRATROPIUM BROMIDE AND ALBUTEROL SULFATE 3 MILLILITER(S): .5; 3 SOLUTION RESPIRATORY (INHALATION) at 23:10

## 2024-07-11 RX ADMIN — IPRATROPIUM BROMIDE AND ALBUTEROL SULFATE 3 MILLILITER(S): .5; 3 SOLUTION RESPIRATORY (INHALATION) at 15:20

## 2024-07-11 RX ADMIN — Medication 400 MILLIGRAM(S): at 06:27

## 2024-07-11 RX ADMIN — INSULIN LISPRO 2: 100 INJECTION, SOLUTION SUBCUTANEOUS at 22:48

## 2024-07-11 RX ADMIN — LOSARTAN POTASSIUM 25 MILLIGRAM(S): 100 TABLET, FILM COATED ORAL at 21:44

## 2024-07-11 RX ADMIN — Medication 25 MILLIGRAM(S): at 21:44

## 2024-07-11 RX ADMIN — IPRATROPIUM BROMIDE AND ALBUTEROL SULFATE 3 MILLILITER(S): .5; 3 SOLUTION RESPIRATORY (INHALATION) at 07:12

## 2024-07-11 NOTE — PATIENT PROFILE ADULT - FALL HARM RISK - HARM RISK INTERVENTIONS

## 2024-07-11 NOTE — H&P ADULT - ATTENDING COMMENTS
62 yo F with idiopathic hypertrophic subaortic stenosis (IHSS), likely COPD (per last pulm clinic note) , HTN, DM2, current smoker, here with copd exacerbation 2/2 parainfluenza infection     # COPD exacerbation   prednisone 40mg x 4 more days ,   paco standing for now   [ ] start on maintenance inhaler symbicort on discharge (needs new prescription )   [ ] outpatient f/u with pulmonology     # T2DM   can get linagliptin while inpatient    # idiopathic hypertrophic subaortic stenosis (IHSS),  continue metoprolol 25mg qd   maintain euvolemia ;

## 2024-07-11 NOTE — H&P ADULT - HISTORY OF PRESENT ILLNESS
Leanne Fournier is a 63 year old female with a PMHx significant for COPD, idiopathic hypertrophic subaortic stenosis, HTN, HLD, DMII, GERD, lumbar disc herniation, current smoker. She presented to the ED the morning of 7/11/24 for increasing cough and shortness of breath. Her symptoms started on Monday 7/8 and have progressively been worsening. Has been taking at home inhalers and anti-cough medications at home with little benefit. Does not require O2 at home. Takes her inhaler daily. Now breathing well on 2L NC. Endorses cough, SOB, decreased fluid intake, constipation, rib pain. Denies fever, chills.       ED Course  Vitals: T 37.2, /76, , SPO2 96 on 2L  Labs: +parainfluenza,   Imaging: CXR --> lungs clear, no infiltrate, pleural effusion, or pneumothorax  EKG: rate 114 sinus rhythm   Interventions: Duoneb 3mL Q20min 3x, methylprednisolone 125mg IV, MagSulfate 2g, 1L NaCl, 2L oxygen

## 2024-07-11 NOTE — DISCHARGE NOTE PROVIDER - HOSPITAL COURSE
#Discharge: do not delete    52 yo F with idiopathic hypertrophic subaortic stenosis (IHSS), likely COPD (per last pulm clinic note) , HTN, DM2, current smoker, here with copd exacerbation 2/2 parainfluenza infection     Hospital course (by problem):     7/11 - admitted to hospital for COPD exacerbation requiring 2L NC  o/n: tachy 110-120's, started mucinex, EKG ordered qTC 342, duoneb x2, pt requested tylenol for headache 2/2 continous coughing, IV tylenol ordered  7/12: started azithromycin for COPD exacerbation. continue with duonebs and mucinex, hypertonic saline, and throat lozenges for symptomatic relief of cough symptoms. pt amenable to varenicline for smoking cessation; however, med is non-formulary and not-accessible at this time. will escalate. taken off O2. pt O2 sat at 90% off oxygen for 1 hour   07/13: benzonate was ordered this morning due to abdominal pain from cough   o/n  7/14: dc hypertonic saline    Patient was discharged to: (home/[facility]/acute rehab/hospice, etc, and with what services – home health PT/RN? Home O2?)    New medications:   Changes to old medications:  Medications that were stopped:    Items to follow up as outpatient:    Physical exam at the time of discharge:       #Discharge: do not delete    52 yo F with idiopathic hypertrophic subaortic stenosis (IHSS), likely COPD (per last pulm clinic note) , HTN, DM2, current smoker, here with copd exacerbation 2/2 parainfluenza infection     Hospital course (by problem):     7/11 - admitted to hospital for COPD exacerbation requiring 2L NC  o/n: tachy 110-120's, started mucinex, EKG ordered qTC 342, duoneb x2, pt requested tylenol for headache 2/2 continous coughing, IV tylenol ordered  7/12: started azithromycin for COPD exacerbation. continue with duonebs and mucinex, hypertonic saline, and throat lozenges for symptomatic relief of cough symptoms. pt amenable to varenicline for smoking cessation; however, med is non-formulary and not-accessible at this time. will escalate. taken off O2. pt O2 sat at 90% off oxygen for 1 hour   07/13: benzonate was ordered this morning due to abdominal pain from cough   o/n  7/14: dc hypertonic saline    Patient was discharged to: home    New medications:   Changes to old medications:  Medications that were stopped:    Items to follow up as outpatient:    Physical exam at the time of discharge:    PHYSICAL EXAM:  Constitutional: resting comfortably in bed; NAD  Head: NC/AT  Eyes: PERRL, EOMI, anicteric sclera  ENT: no nasal discharge; MMM  Neck: supple; no JVD or thyromegaly  Respiratory: mild wheezing heard throughout lung fields  Cardiac: +S1/S2; RRR; no M/R/G  Gastrointestinal: soft, NT/ND; no rebound or guarding; +BSx4  Extremities: WWP, no clubbing or cyanosis; no peripheral edema. Capillary refill <2 sec  Musculoskeletal: NROM x4; no joint swelling, tenderness or erythema  Vascular: 2+ radial, DP/PT pulses B/L  Dermatologic: skin warm, dry and intact; no rashes, wounds, or scars  Neurologic: AAOx3; CNII-XII grossly intact; no focal deficits             #Discharge: do not delete    52 yo F with idiopathic hypertrophic subaortic stenosis (IHSS), likely COPD (per last pulm clinic note) , HTN, DM2, current smoker, here with copd exacerbation 2/2 parainfluenza infection     Hospital course (by problem):     7/11 - admitted to hospital for COPD exacerbation requiring 2L NC  o/n: tachy 110-120's, started mucinex, EKG ordered qTC 342, duoneb x2, pt requested tylenol for headache 2/2 continous coughing, IV tylenol ordered  7/12: started azithromycin for COPD exacerbation. continue with duonebs and mucinex, hypertonic saline, and throat lozenges for symptomatic relief of cough symptoms. pt amenable to varenicline for smoking cessation; however, med is non-formulary and not-accessible at this time. will escalate. taken off O2. pt O2 sat at 90% off oxygen for 1 hour   07/13: benzonate was ordered this morning due to abdominal pain from cough   o/n  7/14: dc hypertonic saline, patient wants to stay another night so that she can  her meds on the same day from her pharmacy and there is no med rec on her so will call pharmacy tomorrow to figure out what she takes, will keep her here tonight  o/n:MIRELLA  7/15: Pt feeling much better on room air. Significantly decreased wheezing.      Patient was discharged to: home    New medications: Duonebs q6 hours PRN, prednisone 40mg x1 day, azithromycin 250mg x1 day   Changes to old medications: none  Medications that were stopped: none  Resume all home medications: losartan, metoprolol, atorvastatin, metformin    Items to follow up as outpatient: Pulmonary follow-up for COPD, Smoking Cessation    Physical exam at the time of discharge:    PHYSICAL EXAM:  Constitutional: resting comfortably in bed; NAD  Head: NC/AT  Eyes: PERRL, EOMI, anicteric sclera  ENT: no nasal discharge; MMM  Neck: supple; no JVD or thyromegaly  Respiratory: mild wheezing heard throughout lung fields  Cardiac: +S1/S2; RRR; no M/R/G  Gastrointestinal: soft, NT/ND; no rebound or guarding; +BSx4  Extremities: WWP, no clubbing or cyanosis; no peripheral edema. Capillary refill <2 sec  Musculoskeletal: NROM x4; no joint swelling, tenderness or erythema  Vascular: 2+ radial, DP/PT pulses B/L  Dermatologic: skin warm, dry and intact; no rashes, wounds, or scars  Neurologic: AAOx3; CNII-XII grossly intact; no focal deficits             #Discharge: do not delete    52 yo F with idiopathic hypertrophic subaortic stenosis (IHSS), likely COPD (per last pulm clinic note) , HTN, DM2, current smoker, here with copd exacerbation 2/2 parainfluenza infection     Hospital course (by problem):     # Acute  COPD exacerbation   # HTN   # Type 2 Diabetes Mellitus   # Idiopathic Sub Aortic Stenosis   # Morbid Obesity     7/11 - admitted to hospital for COPD exacerbation requiring 2L NC  o/n: tachy 110-120's, started mucinex, EKG ordered qTC 342, duoneb x2, pt requested tylenol for headache 2/2 continous coughing, IV tylenol ordered  7/12: started azithromycin for COPD exacerbation. continue with duonebs and mucinex, hypertonic saline, and throat lozenges for symptomatic relief of cough symptoms. pt amenable to varenicline for smoking cessation; however, med is non-formulary and not-accessible at this time. will escalate. taken off O2. pt O2 sat at 90% off oxygen for 1 hour   07/13: benzonate was ordered this morning due to abdominal pain from cough   o/n  7/14: dc hypertonic saline, patient wants to stay another night so that she can  her meds on the same day from her pharmacy and there is no med rec on her so will call pharmacy tomorrow to figure out what she takes, will keep her here tonight  o/n:MIRELLA  7/15: Pt feeling much better on room air. Significantly decreased wheezing.      Patient was discharged to: home    New medications: Duonebs q6 hours PRN, prednisone 40mg x1 day, azithromycin 250mg x1 day   Changes to old medications: none  Medications that were stopped: none  Resume all home medications: losartan, metoprolol, atorvastatin, metformin    Items to follow up as outpatient: Pulmonary follow-up for COPD, Smoking Cessation    Physical exam at the time of discharge:    PHYSICAL EXAM:  Constitutional: resting comfortably in bed; NAD  Head: NC/AT  Eyes: PERRL, EOMI, anicteric sclera  ENT: no nasal discharge; MMM  Neck: supple; no JVD or thyromegaly  Respiratory: mild wheezing heard throughout lung fields  Cardiac: +S1/S2; RRR; no M/R/G  Gastrointestinal: soft, NT/ND; no rebound or guarding; +BSx4  Extremities: WWP, no clubbing or cyanosis; no peripheral edema. Capillary refill <2 sec  Musculoskeletal: NROM x4; no joint swelling, tenderness or erythema  Vascular: 2+ radial, DP/PT pulses B/L  Dermatologic: skin warm, dry and intact; no rashes, wounds, or scars  Neurologic: AAOx3; CNII-XII grossly intact; no focal deficits

## 2024-07-11 NOTE — ED PROVIDER NOTE - OBJECTIVE STATEMENT
63F hx asthma, htn, dm, c/o SOB. pt states over past 3 days having cough, sob. states some scratchy throat and headache. no vomiting. no fevers. no recent travel. no sick contacts. states used her albuterol without relief. no hx of intubations.

## 2024-07-11 NOTE — DISCHARGE NOTE PROVIDER - NSDCCPCAREPLAN_GEN_ALL_CORE_FT
PRINCIPAL DISCHARGE DIAGNOSIS  Diagnosis: COPD exacerbation  Assessment and Plan of Treatment: An exacerbation is a flare-up or episode when your breathing is worse than usual and may continue to worsen without treatment. With COPD, you may be able to participate in the same activities for weeks or months without having worsening symptoms. Then suddenly you may have a flare-up where your cough, shortness of breath, or mucus may increase. This is often caused by a lung infection such a the virus parainfluenzae that you tested positive for. Exacerbations can be very serious, causing you to go to the emergency room or have to stay in the hospital for up to several days. Close follow-up with a pulmonologist and smoking cessation can help prevent future flares and exacerbations.     PRINCIPAL DISCHARGE DIAGNOSIS  Diagnosis: COPD exacerbation  Assessment and Plan of Treatment: An exacerbation is a flare-up or episode when your breathing is worse than usual and may continue to worsen without treatment. With COPD, you may be able to participate in the same activities for weeks or months without having worsening symptoms. Then suddenly you may have a flare-up where your cough, shortness of breath, or mucus may increase. This is often caused by a lung infection such a the virus parainfluenzae that you tested positive for. Exacerbations can be very serious, causing you to go to the emergency room or have to stay in the hospital for up to several days. Close follow-up with a pulmonologist and smoking cessation can help prevent future flares and exacerbations.  Please continue with 1 additional day of:   Azithromycin 250mg   Prednisone 40mg   Furthermore, please continue taking Duonebs (nebulized treatments) as needed for further exacerbations.

## 2024-07-11 NOTE — ED PROVIDER NOTE - NSFOLLOWUPINSTRUCTIONS_ED_ALL_ED_FT
Asthma    WHAT YOU NEED TO KNOW:    Asthma is a lung disease that makes breathing difficult. Chronic inflammation and reactions to triggers narrow the airways in the lungs. Asthma can become life-threatening if it is not managed.    DISCHARGE INSTRUCTIONS:    Call your local emergency number (911 in the [place]) if:    You have severe shortness of breath.    The skin around your neck and ribs pulls in with each breath.    Your peak flow numbers are in the red zone of your AAP.  Return to the emergency department if:    You have shortness of breath, even after you take your short-term medicine as directed.    Your lips or nails turn blue or gray.  Call your doctor or asthma specialist if:    You run out of medicine before your next refill is due.    Your symptoms get worse.    You need to take more medicine than usual to control your symptoms.    You have questions or concerns about your condition or care.  Medicines:    Medicines may be used to decrease inflammation, open airways, and make it easier to breathe. Medicines may be inhaled, taken as a pill, or injected. Short-term medicines relieve your symptoms quickly. Long-term medicines are used to prevent future asthma attacks. Other medicines may be needed if your regular medicines are not able to prevent attacks. You may also need medicine to help control your allergies. Ask your healthcare provider for more information about any medicine you are given and how to take it safely.    Take your medicine as directed. Contact your healthcare provider if you think your medicine is not helping or if you have side effects. Tell your provider if you are allergic to any medicine. Keep a list of the medicines, vitamins, and herbs you take. Include the amounts, and when and why you take them. Bring the list or the pill bottles to follow-up visits. Carry your medicine list with you in case of an emergency.  Manage your symptoms and prevent future attacks:    Follow your Asthma Action Plan (AAP). This is a written plan that you and your healthcare provider create. It explains which medicine you need and when to change doses if necessary. It also explains how you can monitor symptoms and use a peak flow meter. The meter measures how well your lungs are working.    Manage other health conditions, such as allergies, acid reflux, and sleep apnea.    Identify and avoid triggers. These may include pets, dust mites, mold, and cockroaches.    Do not smoke or be around others who smoke. Nicotine and other chemicals in cigarettes and cigars can cause lung damage. Ask your healthcare provider for information if you currently smoke and need help to quit. E-cigarettes or smokeless tobacco still contain nicotine. Talk to your healthcare provider before you use these products.    Ask about the flu vaccine. The flu can make your asthma worse. You may need a yearly flu shot.  Follow up with your healthcare provider as directed: You will need to return to make sure your medicine is working and your symptoms are controlled. You may be referred to an asthma or allergy specialist. You may be asked to keep a record of your peak flow values and bring it with you to your appointments. Write down your questions so you remember to ask them during your visits.

## 2024-07-11 NOTE — DISCHARGE NOTE PROVIDER - NSDCFUSCHEDAPPT_GEN_ALL_CORE_FT
[facility]  [address]  Scheduled Appointment: 07/23/2024

## 2024-07-11 NOTE — ED PROVIDER NOTE - ENMT, MLM
Airway patent,  Mouth with normal mucosa. Throat has no vesicles, no oropharyngeal exudates and uvula is midline. no drooling, no stridor

## 2024-07-11 NOTE — ED PROVIDER NOTE - CLINICAL SUMMARY MEDICAL DECISION MAKING FREE TEXT BOX
diffuse wheezing, cough, c/w asthma exacerbation, possible viral infection  -check labs  -ekg  -CXR to r/o pna  -duonebs, solumedrol, mag, tylenol

## 2024-07-11 NOTE — H&P ADULT - PROBLEM SELECTOR PLAN 1
Has moderate COPD. Increased coughing and shortness of breath since Monday 7/8. S/p Duonebs, 2g Magnesium Sulfate, 125mg Methylprednisolone on 7/11 AM in the ED. Now with improved work of breathing and on 2L NC.     - Albuterol Q4H  - Prednisone 40mg QD for 5 days w/ taper  - O2 goal of 88-92%  - Ween nasal cannula as tolerated Has moderate COPD. Increased coughing and shortness of breath since Monday 7/8. S/p Duonebs, 2g Magnesium Sulfate, 125mg Methylprednisolone on 7/11 AM in the ED. Now with improved work of breathing and on 2L NC. Will need F/U w/ pulm.     - Duonebs Q4H  - Prednisone 40mg QD for 4 days w/ taper  - O2 goal of 88-92%  - Ween nasal cannula as tolerated

## 2024-07-11 NOTE — H&P ADULT - PROBLEM SELECTOR PLAN 9
VTE prophylaxis: activity as tolerated  Diet: regular  Activity: activity as tolerated - Continue PTA cyclobenzaprine prn

## 2024-07-11 NOTE — H&P ADULT - NSHPSOCIALHISTORY_GEN_ALL_CORE
Smoking: yes 1 pack per week  Alcohol: no  Recreational Drug Use: none    Living Situation: lives with daughter at home with elevator  Activity: full ambulation

## 2024-07-11 NOTE — ED ADULT NURSE NOTE - NS ED NURSE RECORD ANOTHER HT AND WT
What Is The Reason For Today's Visit?: Full Body Skin Examination
What Is The Reason For Today's Visit? (Being Monitored For X): concerning skin lesions on an annual basis
Yes

## 2024-07-11 NOTE — ED ADULT NURSE NOTE - OBJECTIVE STATEMENT
pt walked in aox4 c/o asthma exacerbation - cough, sob for 3 days. pt asthma medications are not providing relief. pt tachy to 110s, placed on ccm. ekg completed. peripheral iv placed.

## 2024-07-11 NOTE — DISCHARGE NOTE PROVIDER - NSDCMRMEDTOKEN_GEN_ALL_CORE_FT
Albuterol (Eqv-ProAir HFA) 90 mcg/inh inhalation aerosol: 2 puff(s) inhaled  atorvastatin 40 mg oral tablet: 1 tab(s) orally  calcium-vitamin D:   cyclobenzaprine 10 mg oral tablet: 1 tab(s) orally 3 times a day prn back pain/ spasms  Jardiance 25 mg oral tablet: 1 tab(s) orally  losartan 25 mg oral tablet: 1 tab(s) orally  MetFORMIN (Eqv-Fortamet) 500 mg oral tablet, extended release: 2 tab(s) orally  metoprolol succinate 25 mg oral capsule, extended release: 1 cap(s) orally   atorvastatin 40 mg oral tablet: 1 tab(s) orally  calcium-vitamin D:   cyclobenzaprine 10 mg oral tablet: 1 tab(s) orally 3 times a day prn back pain/ spasms  losartan 25 mg oral tablet: 1 tab(s) orally  metoprolol succinate 25 mg oral capsule, extended release: 1 cap(s) orally   atorvastatin 40 mg oral tablet: 1 tab(s) orally  azithromycin 250 mg oral tablet: 1 tab(s) orally once a day  calcium-vitamin D:   cyclobenzaprine 10 mg oral tablet: 1 tab(s) orally 3 times a day prn back pain/ spasms  losartan 25 mg oral tablet: 1 tab(s) orally  metoprolol succinate 25 mg oral capsule, extended release: 1 cap(s) orally  predniSONE 20 mg oral tablet: 2 tab(s) orally once a day   atorvastatin 40 mg oral tablet: 1 tab(s) orally  azithromycin 250 mg oral tablet: 1 tab(s) orally once a day  calcium-vitamin D:   cyclobenzaprine 10 mg oral tablet: 1 tab(s) orally 3 times a day prn back pain/ spasms  linagliptin 5 mg oral tablet: 1 tab(s) orally once a day  losartan 25 mg oral tablet: 1 tab(s) orally  metoprolol succinate 25 mg oral capsule, extended release: 1 cap(s) orally  predniSONE 20 mg oral tablet: 2 tab(s) orally once a day   atorvastatin 40 mg oral tablet: 1 tab(s) orally once a day (at bedtime)  azithromycin 250 mg oral tablet: 1 tab(s) orally once a day  ipratropium-albuterol 0.5 mg-2.5 mg/3 mL inhalation solution: 3 milliliter(s) inhaled every 4 hours as needed for  shortness of breath and/or wheezing  losartan 25 mg oral tablet: 1 tab(s) orally once a day  metoprolol succinate 25 mg oral tablet, extended release: 1 tab(s) orally once a day  nebulizer: use with paco  predniSONE 20 mg oral tablet: 2 tab(s) orally once a day

## 2024-07-11 NOTE — DISCHARGE NOTE PROVIDER - PROVIDER TOKENS
PROVIDER:[TOKEN:[[id number]]] PROVIDER:[TOKEN:[[id number]],SCHEDULEDAPPT:[07/23/2024],SCHEDULEDAPPTTIME:[11:00 AM],ESTABLISHEDPATIENT:[T]]

## 2024-07-11 NOTE — DISCHARGE NOTE PROVIDER - NSDCFUADDAPPT_GEN_ALL_CORE_FT
please follow up with pulmonology 1-2 weeks after discharge please follow up with pulmonology 1 week after discharge Please bring your Insurance card, Photo ID and Discharge paperwork to the following appointment:    (1) Please follow up with your Pulmonary Medicine Provider, Dr. Eloise Ramirez at [address] [place] [address], [address], [place] [address] on 7/23/2024 at 11:00am.    Appointment was scheduled by Ms. ANIBAL Paz, Referral Coordinator.

## 2024-07-11 NOTE — ED ADULT NURSE REASSESSMENT NOTE - NS ED NURSE REASSESS COMMENT FT1
Received patient. patient is stable at bed, A&OX4. no sob, pain at this moment. respirations unlabored.

## 2024-07-11 NOTE — H&P ADULT - PROBLEM SELECTOR PLAN 4
Currently smokes 1 pack per week. Has tried nicotine gum and had discomfort with patches in the past. Would like to quit and is open to discuss medications for smoking cessation. Currently smokes 1 pack per week. Has tried nicotine gum and had discomfort with patches in the past. Would like to quit and is open to discuss medications for smoking cessation. Information regarding pharmacologic interventions for smoking cessation including varenicline and buproprion given.

## 2024-07-11 NOTE — H&P ADULT - PROBLEM SELECTOR PLAN 3
Reports several days since last bowel movement. Normally has bowel movement every 2 days.     - Senna and miralax QD

## 2024-07-11 NOTE — H&P ADULT - PROBLEM SELECTOR PLAN 5
- Continue PTA jardiance  - Continue PTA metformin - F/U A1C  - SSI  - Start linagliptin for inpatient duration  - Hold PTA jardiance  - Hold PTA metformin

## 2024-07-11 NOTE — H&P ADULT - ASSESSMENT
NISHANT GARCIA is a 63y Female with a PMHx significant for COPD, idiopathic hypertrophic subaortic stenosis, HTN, HLD, DMII, GERD, lumbar disc herniation, current smoker. Admitted to the hospital for increasing oxygen requirements in the setting of COPD exacerbation secondary to parainfluenza virus.

## 2024-07-11 NOTE — H&P ADULT - PROBLEM SELECTOR PLAN 10
VTE prophylaxis: activity as tolerated  Diet: regular  Activity: activity as tolerated VTE prophylaxis: lovenox 40mg  Diet: regular  Activity: activity as tolerated

## 2024-07-11 NOTE — DISCHARGE NOTE PROVIDER - CARE PROVIDERS DIRECT ADDRESSES
[email or web address]

## 2024-07-11 NOTE — H&P ADULT - NSHPLABSRESULTS_GEN_ALL_CORE
LABS:                         13.0   6.88  )-----------( 257      ( 11 Jul 2024 05:58 )             41.1     07-11    136  |  100   |  7   ----------------------------<  2237  3.8   |  24   |  0.73     Ca    9.6      11 Jul 2024 05:58        Urinalysis Basic - ( 11 Jul 2024 05:58 )    Color: x / Appearance: x / SG: x / pH: x  Gluc: 237 mg/dL / Ketone: x  / Bili: x / Urobili: x   Blood: x / Protein: x / Nitrite: x   Leuk Esterase: x / RBC: x / WBC x   Sq Epi: x / Non Sq Epi: x / Bacteria: x          RADIOLOGY, EKG & ADDITIONAL TESTS:   CXR 7/11/24 @ 6:15am  Lungs clear. No infiltrate pleural effusion or pneumothorax. Unremarkable cardiac silhouette. No acute bone abnormality.

## 2024-07-11 NOTE — ED PROVIDER NOTE - PROGRESS NOTE DETAILS
Ree: pt received from night team at s/o; pt p/w b/l periorbital swelling, with concern for possible periorbital cellulitis, awaiting ct orbits to r/o orbital cellulitis. Given unasyn iv. Dispo pending ct results. Will continue to monitor. Ree: pt received from night team at s/o; pt p/w asthma exacerbation w/ diffuse wheezing b/l. CXR clear. Pt given duonebs, solumedrol, mag. Will re-assess post tx. PA Hellerman - pt re-evaluated, o2 sat 89-90% on RA, still with diffuse wheeze/ rhonchi and coughing during exam,  otherwise not tachypneic/ or struggling to breathe.  able to speak in full sentences.  placed on 2l NC- 94%.  Will admit for further tx/observation of asthma/ copd exac.  Stable for floor. D/w MADELEINE for admission

## 2024-07-11 NOTE — ED PROVIDER NOTE - NSICDXPASTMEDICALHX_GEN_ALL_CORE_FT
PAST MEDICAL HISTORY:  Asthma     DM (diabetes mellitus)     Drug abuse     HLD (hyperlipidemia)     HTN (hypertension)

## 2024-07-11 NOTE — H&P ADULT - NSICDXPASTMEDICALHX_GEN_ALL_CORE_FT
PAST MEDICAL HISTORY:  COPD, moderate     Current smoker     DM (diabetes mellitus)     HLD (hyperlipidemia)     HTN (hypertension)     IHSS (idiopathic hypertrophic subaortic stenosis)     Lumbar disc herniation

## 2024-07-11 NOTE — DISCHARGE NOTE PROVIDER - CARE PROVIDER_API CALL
Eloise Ramirez  Critical Care Medicine  [address]  [place] [address]  Phone: [phone]  Fax: [phone]  Follow Up Time:    Eloise Ramirez  Critical Care Medicine  [address], [address]  [place] [address]  Phone: [phone]  Fax: [phone]  Established Patient  Scheduled Appointment: 07/23/2024 11:00 AM

## 2024-07-12 LAB
A1C WITH ESTIMATED AVERAGE GLUCOSE RESULT: 7.6 % — HIGH (ref 4–5.6)
ALBUMIN SERPL ELPH-MCNC: 4.4 G/DL — SIGNIFICANT CHANGE UP (ref 3.3–5)
ALP SERPL-CCNC: 112 U/L — SIGNIFICANT CHANGE UP (ref 40–120)
ALT FLD-CCNC: 18 U/L — SIGNIFICANT CHANGE UP (ref 10–45)
ANION GAP SERPL CALC-SCNC: 12 MMOL/L — SIGNIFICANT CHANGE UP (ref 5–17)
AST SERPL-CCNC: 27 U/L — SIGNIFICANT CHANGE UP (ref 10–40)
BILIRUB SERPL-MCNC: 0.2 MG/DL — SIGNIFICANT CHANGE UP (ref 0.2–1.2)
BLD GP AB SCN SERPL QL: NEGATIVE — SIGNIFICANT CHANGE UP
BUN SERPL-MCNC: 12 MG/DL — SIGNIFICANT CHANGE UP (ref 7–23)
CALCIUM SERPL-MCNC: 9.8 MG/DL — SIGNIFICANT CHANGE UP (ref 8.4–10.5)
CHLORIDE SERPL-SCNC: 99 MMOL/L — SIGNIFICANT CHANGE UP (ref 96–108)
CO2 SERPL-SCNC: 26 MMOL/L — SIGNIFICANT CHANGE UP (ref 22–31)
CREAT SERPL-MCNC: 0.86 MG/DL — SIGNIFICANT CHANGE UP (ref 0.5–1.3)
EGFR: 77 ML/MIN/1.73M2 — SIGNIFICANT CHANGE UP
ESTIMATED AVERAGE GLUCOSE: 171 MG/DL — HIGH (ref 68–114)
GLUCOSE BLDC GLUCOMTR-MCNC: 107 MG/DL — HIGH (ref 70–99)
GLUCOSE BLDC GLUCOMTR-MCNC: 156 MG/DL — HIGH (ref 70–99)
GLUCOSE BLDC GLUCOMTR-MCNC: 166 MG/DL — HIGH (ref 70–99)
GLUCOSE BLDC GLUCOMTR-MCNC: 194 MG/DL — HIGH (ref 70–99)
GLUCOSE SERPL-MCNC: 254 MG/DL — HIGH (ref 70–99)
HCT VFR BLD CALC: 43.3 % — SIGNIFICANT CHANGE UP (ref 34.5–45)
HGB BLD-MCNC: 13.2 G/DL — SIGNIFICANT CHANGE UP (ref 11.5–15.5)
HIV 1+2 AB+HIV1 P24 AG SERPL QL IA: SIGNIFICANT CHANGE UP
MAGNESIUM SERPL-MCNC: 2.1 MG/DL — SIGNIFICANT CHANGE UP (ref 1.6–2.6)
MCHC RBC-ENTMCNC: 26.5 PG — LOW (ref 27–34)
MCHC RBC-ENTMCNC: 30.5 GM/DL — LOW (ref 32–36)
MCV RBC AUTO: 86.8 FL — SIGNIFICANT CHANGE UP (ref 80–100)
NRBC # BLD: 0 /100 WBCS — SIGNIFICANT CHANGE UP (ref 0–0)
PHOSPHATE SERPL-MCNC: 4.8 MG/DL — HIGH (ref 2.5–4.5)
PLATELET # BLD AUTO: 271 K/UL — SIGNIFICANT CHANGE UP (ref 150–400)
POTASSIUM SERPL-MCNC: 4.8 MMOL/L — SIGNIFICANT CHANGE UP (ref 3.5–5.3)
POTASSIUM SERPL-SCNC: 4.8 MMOL/L — SIGNIFICANT CHANGE UP (ref 3.5–5.3)
PROT SERPL-MCNC: 7.9 G/DL — SIGNIFICANT CHANGE UP (ref 6–8.3)
RBC # BLD: 4.99 M/UL — SIGNIFICANT CHANGE UP (ref 3.8–5.2)
RBC # FLD: 14.9 % — HIGH (ref 10.3–14.5)
RH IG SCN BLD-IMP: POSITIVE — SIGNIFICANT CHANGE UP
SODIUM SERPL-SCNC: 137 MMOL/L — SIGNIFICANT CHANGE UP (ref 135–145)
WBC # BLD: 9.78 K/UL — SIGNIFICANT CHANGE UP (ref 3.8–10.5)
WBC # FLD AUTO: 9.78 K/UL — SIGNIFICANT CHANGE UP (ref 3.8–10.5)

## 2024-07-12 PROCEDURE — 99233 SBSQ HOSP IP/OBS HIGH 50: CPT | Mod: GC

## 2024-07-12 RX ORDER — AZITHROMYCIN 250 MG/1
500 TABLET, FILM COATED ORAL ONCE
Refills: 0 | Status: COMPLETED | OUTPATIENT
Start: 2024-07-12 | End: 2024-07-12

## 2024-07-12 RX ORDER — BENZOCAINE AND MENTHOL 15; 3.6 MG/1; MG/1
LOZENGE ORAL
Refills: 0 | Status: DISCONTINUED | OUTPATIENT
Start: 2024-07-12 | End: 2024-07-12

## 2024-07-12 RX ORDER — BENZOCAINE AND MENTHOL 15; 3.6 MG/1; MG/1
1 LOZENGE ORAL ONCE
Refills: 0 | Status: DISCONTINUED | OUTPATIENT
Start: 2024-07-12 | End: 2024-07-15

## 2024-07-12 RX ORDER — BENZOCAINE AND MENTHOL 15; 3.6 MG/1; MG/1
1 LOZENGE ORAL ONCE
Refills: 0 | Status: COMPLETED | OUTPATIENT
Start: 2024-07-12 | End: 2024-07-12

## 2024-07-12 RX ORDER — BENZOCAINE AND MENTHOL 15; 3.6 MG/1; MG/1
1 LOZENGE ORAL
Refills: 0 | Status: DISCONTINUED | OUTPATIENT
Start: 2024-07-12 | End: 2024-07-12

## 2024-07-12 RX ORDER — SODIUM CHLORIDE 0.9 % (FLUSH) 0.9 %
4 SYRINGE (ML) INJECTION EVERY 12 HOURS
Refills: 0 | Status: DISCONTINUED | OUTPATIENT
Start: 2024-07-12 | End: 2024-07-14

## 2024-07-12 RX ORDER — ACETAMINOPHEN 325 MG
1000 TABLET ORAL ONCE
Refills: 0 | Status: COMPLETED | OUTPATIENT
Start: 2024-07-12 | End: 2024-07-12

## 2024-07-12 RX ORDER — AZITHROMYCIN 250 MG/1
250 TABLET, FILM COATED ORAL DAILY
Refills: 0 | Status: DISCONTINUED | OUTPATIENT
Start: 2024-07-13 | End: 2024-07-15

## 2024-07-12 RX ADMIN — IPRATROPIUM BROMIDE AND ALBUTEROL SULFATE 3 MILLILITER(S): .5; 3 SOLUTION RESPIRATORY (INHALATION) at 17:22

## 2024-07-12 RX ADMIN — Medication 600 MILLIGRAM(S): at 17:22

## 2024-07-12 RX ADMIN — INSULIN LISPRO 2: 100 INJECTION, SOLUTION SUBCUTANEOUS at 10:07

## 2024-07-12 RX ADMIN — LINAGLIPTIN 5 MILLIGRAM(S): 5 TABLET, FILM COATED ORAL at 11:49

## 2024-07-12 RX ADMIN — Medication 10 MILLIGRAM(S): at 22:16

## 2024-07-12 RX ADMIN — POLYETHYLENE GLYCOL 3350 17 GRAM(S): 1 POWDER ORAL at 11:50

## 2024-07-12 RX ADMIN — ATORVASTATIN CALCIUM 40 MILLIGRAM(S): 20 TABLET, FILM COATED ORAL at 21:27

## 2024-07-12 RX ADMIN — BENZOCAINE AND MENTHOL 1 LOZENGE: 15; 3.6 LOZENGE ORAL at 07:57

## 2024-07-12 RX ADMIN — IPRATROPIUM BROMIDE AND ALBUTEROL SULFATE 3 MILLILITER(S): .5; 3 SOLUTION RESPIRATORY (INHALATION) at 06:00

## 2024-07-12 RX ADMIN — INSULIN LISPRO 2: 100 INJECTION, SOLUTION SUBCUTANEOUS at 13:23

## 2024-07-12 RX ADMIN — LOSARTAN POTASSIUM 25 MILLIGRAM(S): 100 TABLET, FILM COATED ORAL at 05:59

## 2024-07-12 RX ADMIN — Medication 4 MILLILITER(S): at 11:49

## 2024-07-12 RX ADMIN — PREDNISONE 40 MILLIGRAM(S): 10 TABLET ORAL at 06:07

## 2024-07-12 RX ADMIN — INSULIN LISPRO 2: 100 INJECTION, SOLUTION SUBCUTANEOUS at 17:21

## 2024-07-12 RX ADMIN — AZITHROMYCIN 500 MILLIGRAM(S): 250 TABLET, FILM COATED ORAL at 11:49

## 2024-07-12 RX ADMIN — Medication 400 MILLIGRAM(S): at 05:37

## 2024-07-12 RX ADMIN — Medication 1 TABLET(S): at 11:49

## 2024-07-12 RX ADMIN — IPRATROPIUM BROMIDE AND ALBUTEROL SULFATE 3 MILLILITER(S): .5; 3 SOLUTION RESPIRATORY (INHALATION) at 03:28

## 2024-07-12 RX ADMIN — Medication 25 MILLIGRAM(S): at 05:59

## 2024-07-12 RX ADMIN — IPRATROPIUM BROMIDE AND ALBUTEROL SULFATE 3 MILLILITER(S): .5; 3 SOLUTION RESPIRATORY (INHALATION) at 11:50

## 2024-07-12 RX ADMIN — Medication 2 TABLET(S): at 21:27

## 2024-07-12 RX ADMIN — Medication 600 MILLIGRAM(S): at 05:59

## 2024-07-12 RX ADMIN — IPRATROPIUM BROMIDE AND ALBUTEROL SULFATE 3 MILLILITER(S): .5; 3 SOLUTION RESPIRATORY (INHALATION) at 22:13

## 2024-07-12 NOTE — PROGRESS NOTE ADULT - SUBJECTIVE AND OBJECTIVE BOX
NISHANT GARCIA is a 63y Female with a PMHx significant for COPD, idiopathic hypertrophic subaortic stenosis, HTN, HLD, DMII, GERD, lumbar disc herniation, current smoker. Admitted to the hospital for increasing oxygen requirements in the setting of COPD exacerbation secondary to parainfluenza virus.       Pt seen in bed this morning. Reported continued coughing with little sputum production. Feels that she has phlegm in her throat that she cannot get out. No trouble breathing. No N/V, fever, chills, diarrhea. Expressed interest in smoking cessation.         Physical Exam   T(C): 36.8 (07-12-24 @ 09:12), Max: 37.2 (07-11-24 @ 21:06)  HR: 100 (07-12-24 @ 09:12) (97 - 110)  BP: 143/98 (07-12-24 @ 09:12) (133/74 - 168/98)  RR: 18 (07-12-24 @ 09:12) (18 - 19)  SpO2: 95% (07-12-24 @ 09:12) (91% - 100%)    CONSTITUTIONAL: Well groomed, no apparent distress  EYES: PERRLA and symmetric, EOMI, No conjunctival or scleral injection, non-icteric  ENMT: Oral mucosa with moist membranes. Normal dentition  NECK: Supple, symmetric and without tracheal deviation   RESP: mild respiratory distress, no use of accessory muscles; significant wheezing and rales auscultated in all lung fields b/l  CV: RRR, +S1S2, no MRG; no peripheral edema  GI: Soft, NT, mild distention, no rebound, no guarding; no palpable masses; +BS x4  SKIN: No rashes or ulcers noted  NEURO: CN II-XII grossly intact    Labs  WBC Count: 9.78 K/uL  RBC Count: 4.99 M/uL  Hemoglobin: 13.2 g/dL  Hematocrit: 43.3 %  Mean Cell Volume: 86.8 fl  Mean Cell Hemoglobin: 26.5 pg  Mean Cell Hemoglobin Conc: 30.5 gm/dL  Red Cell Distrib Width: 14.9 %  Platelet Count - Automated: 271 K/uL    Sodium: 137 mmol/L  Potassium: 4.8 mmol/L  Chloride: 99 mmol/L  Carbon Dioxide: 26 mmol/L  Anion Gap: 12 mmol/L  Blood Urea Nitrogen: 12: Result confirmed by repeated analysis after passing specimen ID/integrity   check mg/dL  Creatinine: 0.86 mg/dL  Glucose: 254 mg/dL  Calcium: 9.8 mg/dL  Protein Total: 7.9 g/dL  Albumin: 4.4 g/dL  Bilirubin Total: 0.2 mg/dL  Alkaline Phosphatase: 112 U/L  Aspartate Aminotransferase (AST/SGOT): 27 U/L  Alanine Aminotransferase (ALT/SGPT): 18 U/L  eGFR: 77  Magnesium: 2.1 mg/dL (07.12.24 @ 12:00)   Phosphorus: 4.8 mg/dL (07.12.24 @ 12:00)     Imaging   Chest-XRay  HISTORY: Cough    IMPRESSION:    Lungs clear. No infiltrate pleural effusion or pneumothorax. Unremarkable   cardiac silhouette. No acute bone abnormality.

## 2024-07-12 NOTE — PROGRESS NOTE ADULT - PROBLEM SELECTOR PLAN 5
- A1C 7.6%  - SSI  - c/w linagliptin for inpatient duration  - Hold PTA jardiance  - Hold PTA metformin

## 2024-07-12 NOTE — PROGRESS NOTE ADULT - PROBLEM SELECTOR PLAN 1
Has moderate COPD. Increased coughing and shortness of breath since Monday 7/8. S/p Duonebs, 2g Magnesium Sulfate, 125mg Methylprednisolone on 7/11 AM in the ED.  Will need F/U w/ pulm.   Plan  - Duonebs Q4H  - Prednisone 40mg QD for 4 days w/ taper  - O2 goal of 88-92%  - weaned off NC today. O2 sat X on room air.  -start course of azithromycin (500mg day 1, then 250 mg x4 days) for COPD exacerbation   - will continue with mucinex, hypertonic saline inhaler and benzocaine/menthol lozenges for increased cough, avoid cough suppressants Has moderate COPD. Increased coughing and shortness of breath since Monday 7/8. S/p Duonebs, 2g Magnesium Sulfate, 125mg Methylprednisolone on 7/11 AM in the ED.  Will need F/U w/ pulm.   Plan  - Duonebs Q4H  - Prednisone 40mg QD for 4 days w/ taper  - O2 goal of 88-92%  - weaned off NC today. O2 sat 90% on room air after 1 hour off NC.  -start course of azithromycin (500mg day 1, then 250 mg x4 days) for COPD exacerbation   - will continue with mucinex, hypertonic saline inhaler and benzocaine/menthol lozenges for increased cough, avoid cough suppressants

## 2024-07-12 NOTE — PROGRESS NOTE ADULT - PROBLEM SELECTOR PLAN 4
Currently smokes 1 pack per week. Has tried nicotine gum and had discomfort with patches in the past. Would like to quit and is open to discuss medications for smoking cessation. Information regarding pharmacologic interventions for smoking cessation including varenicline and buproprion given.

## 2024-07-13 LAB
GLUCOSE BLDC GLUCOMTR-MCNC: 132 MG/DL — HIGH (ref 70–99)
GLUCOSE BLDC GLUCOMTR-MCNC: 161 MG/DL — HIGH (ref 70–99)
GLUCOSE BLDC GLUCOMTR-MCNC: 202 MG/DL — HIGH (ref 70–99)
GLUCOSE BLDC GLUCOMTR-MCNC: 290 MG/DL — HIGH (ref 70–99)

## 2024-07-13 PROCEDURE — 99233 SBSQ HOSP IP/OBS HIGH 50: CPT | Mod: GC

## 2024-07-13 RX ORDER — BENZONATATE 100 MG/1
100 TABLET ORAL EVERY 8 HOURS
Refills: 0 | Status: DISCONTINUED | OUTPATIENT
Start: 2024-07-13 | End: 2024-07-15

## 2024-07-13 RX ADMIN — INSULIN LISPRO 4: 100 INJECTION, SOLUTION SUBCUTANEOUS at 13:10

## 2024-07-13 RX ADMIN — IPRATROPIUM BROMIDE AND ALBUTEROL SULFATE 3 MILLILITER(S): .5; 3 SOLUTION RESPIRATORY (INHALATION) at 18:00

## 2024-07-13 RX ADMIN — INSULIN LISPRO 2: 100 INJECTION, SOLUTION SUBCUTANEOUS at 09:41

## 2024-07-13 RX ADMIN — Medication 600 MILLIGRAM(S): at 06:27

## 2024-07-13 RX ADMIN — LOSARTAN POTASSIUM 25 MILLIGRAM(S): 100 TABLET, FILM COATED ORAL at 06:27

## 2024-07-13 RX ADMIN — AZITHROMYCIN 250 MILLIGRAM(S): 250 TABLET, FILM COATED ORAL at 12:04

## 2024-07-13 RX ADMIN — Medication 600 MILLIGRAM(S): at 17:59

## 2024-07-13 RX ADMIN — LINAGLIPTIN 5 MILLIGRAM(S): 5 TABLET, FILM COATED ORAL at 12:04

## 2024-07-13 RX ADMIN — IPRATROPIUM BROMIDE AND ALBUTEROL SULFATE 3 MILLILITER(S): .5; 3 SOLUTION RESPIRATORY (INHALATION) at 02:22

## 2024-07-13 RX ADMIN — IPRATROPIUM BROMIDE AND ALBUTEROL SULFATE 3 MILLILITER(S): .5; 3 SOLUTION RESPIRATORY (INHALATION) at 14:40

## 2024-07-13 RX ADMIN — Medication 2 TABLET(S): at 21:36

## 2024-07-13 RX ADMIN — Medication 25 MILLIGRAM(S): at 06:27

## 2024-07-13 RX ADMIN — ATORVASTATIN CALCIUM 40 MILLIGRAM(S): 20 TABLET, FILM COATED ORAL at 21:36

## 2024-07-13 RX ADMIN — IPRATROPIUM BROMIDE AND ALBUTEROL SULFATE 3 MILLILITER(S): .5; 3 SOLUTION RESPIRATORY (INHALATION) at 22:28

## 2024-07-13 RX ADMIN — IPRATROPIUM BROMIDE AND ALBUTEROL SULFATE 3 MILLILITER(S): .5; 3 SOLUTION RESPIRATORY (INHALATION) at 12:05

## 2024-07-13 RX ADMIN — INSULIN LISPRO 6: 100 INJECTION, SOLUTION SUBCUTANEOUS at 17:06

## 2024-07-13 RX ADMIN — POLYETHYLENE GLYCOL 3350 17 GRAM(S): 1 POWDER ORAL at 12:04

## 2024-07-13 RX ADMIN — Medication 1 TABLET(S): at 12:05

## 2024-07-13 RX ADMIN — PREDNISONE 40 MILLIGRAM(S): 10 TABLET ORAL at 06:27

## 2024-07-13 RX ADMIN — BENZONATATE 100 MILLIGRAM(S): 100 TABLET ORAL at 12:05

## 2024-07-13 RX ADMIN — IPRATROPIUM BROMIDE AND ALBUTEROL SULFATE 3 MILLILITER(S): .5; 3 SOLUTION RESPIRATORY (INHALATION) at 06:27

## 2024-07-13 RX ADMIN — ENOXAPARIN SODIUM 40 MILLIGRAM(S): 100 INJECTION SUBCUTANEOUS at 14:40

## 2024-07-13 NOTE — PROGRESS NOTE ADULT - SUBJECTIVE AND OBJECTIVE BOX
Patient is a 63y old  Female who presents with a chief complaint of COPD exacerbation (12 Jul 2024 14:03)      HOSPITAL COURSE:     OVERNIGHT EVENTS:    SUBJECTIVE:     ROS: otherwise negative      T(C): 37.1 (07-13-24 @ 04:59), Max: 37.1 (07-13-24 @ 04:59)  HR: 83 (07-13-24 @ 04:59) (83 - 90)  BP: 146/81 (07-13-24 @ 04:59) (146/81 - 168/78)  RR: 18 (07-13-24 @ 04:59) (18 - 18)  SpO2: 91% (07-13-24 @ 04:59) (89% - 94%)  Wt(kg): --Vital Signs Last 24 Hrs  T(C): 37.1 (13 Jul 2024 04:59), Max: 37.1 (13 Jul 2024 04:59)  T(F): 98.8 (13 Jul 2024 04:59), Max: 98.8 (13 Jul 2024 04:59)  HR: 83 (13 Jul 2024 04:59) (83 - 90)  BP: 146/81 (13 Jul 2024 04:59) (146/81 - 168/78)  BP(mean): --  RR: 18 (13 Jul 2024 04:59) (18 - 18)  SpO2: 91% (13 Jul 2024 04:59) (89% - 94%)    Parameters below as of 13 Jul 2024 04:59  Patient On (Oxygen Delivery Method): room air        PHYSICAL EXAM:  Constitutional: resting comfortably in bed; NAD  Head: NC/AT  Eyes: PERRL, EOMI, anicteric sclera  ENT: no nasal discharge; MMM  Neck: supple; no JVD or thyromegaly  Respiratory: CTA B/L; no W/R/R, no retractions  Cardiac: +S1/S2; RRR; no M/R/G  Gastrointestinal: soft, NT/ND; no rebound or guarding; +BSx4  Back: spine midline, no bony tenderness or step-offs; no CVAT B/L  Extremities: WWP, no clubbing or cyanosis; no peripheral edema. Capillary refill <2 sec  Musculoskeletal: NROM x4; no joint swelling, tenderness or erythema  Vascular: 2+ radial, DP/PT pulses B/L  Dermatologic: skin warm, dry and intact; no rashes, wounds, or scars  Lymphatic: no submandibular or cervical LAD  Neurologic: AAOx3; CNII-XII grossly intact; no focal deficits  Psychiatric: affect and characteristics of appearance, verbalizations, behaviors are appropriate    LABS:                        13.2   9.78  )-----------( 271      ( 12 Jul 2024 12:00 )             43.3     07-12    137  |  99  |  12  ----------------------------<  254<H>  4.8   |  26  |  0.86    Ca    9.8      12 Jul 2024 12:00  Phos  4.8     07-12  Mg     2.1     07-12    TPro  7.9  /  Alb  4.4  /  TBili  0.2  /  DBili  x   /  AST  27  /  ALT  18  /  AlkPhos  112  07-12        Urinalysis Basic - ( 12 Jul 2024 12:00 )    Color: x / Appearance: x / SG: x / pH: x  Gluc: 254 mg/dL / Ketone: x  / Bili: x / Urobili: x   Blood: x / Protein: x / Nitrite: x   Leuk Esterase: x / RBC: x / WBC x   Sq Epi: x / Non Sq Epi: x / Bacteria: x      CAPILLARY BLOOD GLUCOSE      POCT Blood Glucose.: 161 mg/dL (13 Jul 2024 09:19)  POCT Blood Glucose.: 107 mg/dL (12 Jul 2024 22:08)  POCT Blood Glucose.: 194 mg/dL (12 Jul 2024 17:11)  POCT Blood Glucose.: 166 mg/dL (12 Jul 2024 13:08)        Urinalysis Basic - ( 12 Jul 2024 12:00 )    Color: x / Appearance: x / SG: x / pH: x  Gluc: 254 mg/dL / Ketone: x  / Bili: x / Urobili: x   Blood: x / Protein: x / Nitrite: x   Leuk Esterase: x / RBC: x / WBC x   Sq Epi: x / Non Sq Epi: x / Bacteria: x        MEDICATIONS  (STANDING):  albuterol/ipratropium for Nebulization 3 milliLiter(s) Nebulizer every 4 hours  atorvastatin 40 milliGRAM(s) Oral at bedtime  azithromycin   Tablet 250 milliGRAM(s) Oral daily  calcium carbonate 1250 mG  + Vitamin D (OsCal 500 + D) 1 Tablet(s) Oral daily  enoxaparin Injectable 40 milliGRAM(s) SubCutaneous every 24 hours  guaiFENesin  milliGRAM(s) Oral every 12 hours  insulin lispro (ADMELOG) corrective regimen sliding scale   SubCutaneous Before meals and at bedtime  linagliptin 5 milliGRAM(s) Oral daily  losartan 25 milliGRAM(s) Oral daily  metoprolol succinate ER 25 milliGRAM(s) Oral daily  polyethylene glycol 3350 17 Gram(s) Oral daily  predniSONE   Tablet 40 milliGRAM(s) Oral daily  senna 2 Tablet(s) Oral at bedtime    MEDICATIONS  (PRN):  benzocaine/menthol Lozenge 1 Lozenge Oral once PRN Sore Throat  benzonatate 100 milliGRAM(s) Oral every 8 hours PRN Cough  cyclobenzaprine 10 milliGRAM(s) Oral three times a day PRN Muscle Spasm  sodium chloride 7% Inhalation 4 milliLiter(s) Inhalation every 12 hours PRN cough      RADIOLOGY & ADDITIONAL TESTS: Reviewed   Patient is a 63y old  Female who presents with a chief complaint of COPD exacerbation (12 Jul 2024 14:03)        OVERNIGHT EVENTS: patient was seen at bedside today, she is doing well and states that her cough is better than it was but she still is getting abdominal pain when she coughs. she states that she is a smoker but would like to quit and since being in the hospital, her cravings have decreased. she also states that she lives alone and has no one to help her with her copd at home but she is independent in her ADLs.     SUBJECTIVE:     ROS: otherwise negative      T(C): 37.1 (07-13-24 @ 04:59), Max: 37.1 (07-13-24 @ 04:59)  HR: 83 (07-13-24 @ 04:59) (83 - 90)  BP: 146/81 (07-13-24 @ 04:59) (146/81 - 168/78)  RR: 18 (07-13-24 @ 04:59) (18 - 18)  SpO2: 91% (07-13-24 @ 04:59) (89% - 94%)  Wt(kg): --Vital Signs Last 24 Hrs  T(C): 37.1 (13 Jul 2024 04:59), Max: 37.1 (13 Jul 2024 04:59)  T(F): 98.8 (13 Jul 2024 04:59), Max: 98.8 (13 Jul 2024 04:59)  HR: 83 (13 Jul 2024 04:59) (83 - 90)  BP: 146/81 (13 Jul 2024 04:59) (146/81 - 168/78)  BP(mean): --  RR: 18 (13 Jul 2024 04:59) (18 - 18)  SpO2: 91% (13 Jul 2024 04:59) (89% - 94%)    Parameters below as of 13 Jul 2024 04:59  Patient On (Oxygen Delivery Method): room air        PHYSICAL EXAM:  Constitutional: resting comfortably in bed; NAD  Head: NC/AT  ENT: no nasal discharge; MMM  Respiratory: diffuse expiratory wheezing in all lung fields; coughing with deep breathing  Cardiac: +S1/S2; RRR; no M/R/G  Gastrointestinal: soft, NT/ND; no rebound or guarding; +BSx4  Extremities: no peripheral edema.   Dermatologic: skin warm, dry and intact; no rashes, wounds, or scars  Neurologic: AAOx4  Psychiatric: affect and characteristics of appearance, verbalizations, behaviors are appropriate    LABS:                        13.2   9.78  )-----------( 271      ( 12 Jul 2024 12:00 )             43.3     07-12    137  |  99  |  12  ----------------------------<  254<H>  4.8   |  26  |  0.86    Ca    9.8      12 Jul 2024 12:00  Phos  4.8     07-12  Mg     2.1     07-12    TPro  7.9  /  Alb  4.4  /  TBili  0.2  /  DBili  x   /  AST  27  /  ALT  18  /  AlkPhos  112  07-12        Urinalysis Basic - ( 12 Jul 2024 12:00 )    Color: x / Appearance: x / SG: x / pH: x  Gluc: 254 mg/dL / Ketone: x  / Bili: x / Urobili: x   Blood: x / Protein: x / Nitrite: x   Leuk Esterase: x / RBC: x / WBC x   Sq Epi: x / Non Sq Epi: x / Bacteria: x      CAPILLARY BLOOD GLUCOSE      POCT Blood Glucose.: 161 mg/dL (13 Jul 2024 09:19)  POCT Blood Glucose.: 107 mg/dL (12 Jul 2024 22:08)  POCT Blood Glucose.: 194 mg/dL (12 Jul 2024 17:11)  POCT Blood Glucose.: 166 mg/dL (12 Jul 2024 13:08)        Urinalysis Basic - ( 12 Jul 2024 12:00 )    Color: x / Appearance: x / SG: x / pH: x  Gluc: 254 mg/dL / Ketone: x  / Bili: x / Urobili: x   Blood: x / Protein: x / Nitrite: x   Leuk Esterase: x / RBC: x / WBC x   Sq Epi: x / Non Sq Epi: x / Bacteria: x        MEDICATIONS  (STANDING):  albuterol/ipratropium for Nebulization 3 milliLiter(s) Nebulizer every 4 hours  atorvastatin 40 milliGRAM(s) Oral at bedtime  azithromycin   Tablet 250 milliGRAM(s) Oral daily  calcium carbonate 1250 mG  + Vitamin D (OsCal 500 + D) 1 Tablet(s) Oral daily  enoxaparin Injectable 40 milliGRAM(s) SubCutaneous every 24 hours  guaiFENesin  milliGRAM(s) Oral every 12 hours  insulin lispro (ADMELOG) corrective regimen sliding scale   SubCutaneous Before meals and at bedtime  linagliptin 5 milliGRAM(s) Oral daily  losartan 25 milliGRAM(s) Oral daily  metoprolol succinate ER 25 milliGRAM(s) Oral daily  polyethylene glycol 3350 17 Gram(s) Oral daily  predniSONE   Tablet 40 milliGRAM(s) Oral daily  senna 2 Tablet(s) Oral at bedtime    MEDICATIONS  (PRN):  benzocaine/menthol Lozenge 1 Lozenge Oral once PRN Sore Throat  benzonatate 100 milliGRAM(s) Oral every 8 hours PRN Cough  cyclobenzaprine 10 milliGRAM(s) Oral three times a day PRN Muscle Spasm  sodium chloride 7% Inhalation 4 milliLiter(s) Inhalation every 12 hours PRN cough      RADIOLOGY & ADDITIONAL TESTS: Reviewed

## 2024-07-13 NOTE — PROGRESS NOTE ADULT - ATTENDING COMMENTS
52 yo F with idiopathic hypertrophic subaortic stenosis (IHSS), likely COPD (per last pulm clinic note) , HTN, DM2, current smoker, here with copd exacerbation 2/2 parainfluenza infection     # COPD exacerbation   Azithromycin  prednisone 40mg x 4 more days ,   duo nebs standing for now   start tesselon pearls   continue Hypertonic Saline Nebs   Goal O2 sats 88-92% on RA   [ ] start on maintenance inhaler Symbicort on discharge (needs new prescription )   [ ] outpatient f/u with pulmonology     # T2DM   can get linagliptin while inpatient    # idiopathic hypertrophic subaortic stenosis (IHSS),  continue metoprolol 25mg qd   maintain euvolemia ;
52 yo F with idiopathic hypertrophic subaortic stenosis (IHSS), likely COPD (per last pulm clinic note) , HTN, DM2, current smoker, here with copd exacerbation 2/2 parainfluenza infection     # COPD exacerbation   Azithromycin  prednisone 40mg x 4 more days ,   paco standing for now   [ ] start on maintenance inhaler Symbicort on discharge (needs new prescription )   [ ] outpatient f/u with pulmonology     # T2DM   can get linagliptin while inpatient    # idiopathic hypertrophic subaortic stenosis (IHSS),  continue metoprolol 25mg qd   maintain euvolemia ;

## 2024-07-13 NOTE — PROGRESS NOTE ADULT - NS ATTEST RISK PROBLEM GEN_ALL_CORE FT
Management of acute exacerbation of COPD with antibiotics , steroids , supplemental o2 , management of IHSS and Diabetes.
Management of acute exacerbation of COPD with antibiotics , steroids , supplemental o2 , management of IHSS and Diabetes

## 2024-07-14 LAB
ALBUMIN SERPL ELPH-MCNC: 4.2 G/DL — SIGNIFICANT CHANGE UP (ref 3.3–5)
ALP SERPL-CCNC: 104 U/L — SIGNIFICANT CHANGE UP (ref 40–120)
ALT FLD-CCNC: 24 U/L — SIGNIFICANT CHANGE UP (ref 10–45)
ANION GAP SERPL CALC-SCNC: 9 MMOL/L — SIGNIFICANT CHANGE UP (ref 5–17)
AST SERPL-CCNC: 23 U/L — SIGNIFICANT CHANGE UP (ref 10–40)
BILIRUB SERPL-MCNC: 0.2 MG/DL — SIGNIFICANT CHANGE UP (ref 0.2–1.2)
BUN SERPL-MCNC: 14 MG/DL — SIGNIFICANT CHANGE UP (ref 7–23)
CALCIUM SERPL-MCNC: 9.7 MG/DL — SIGNIFICANT CHANGE UP (ref 8.4–10.5)
CHLORIDE SERPL-SCNC: 96 MMOL/L — SIGNIFICANT CHANGE UP (ref 96–108)
CO2 SERPL-SCNC: 30 MMOL/L — SIGNIFICANT CHANGE UP (ref 22–31)
CREAT SERPL-MCNC: 0.84 MG/DL — SIGNIFICANT CHANGE UP (ref 0.5–1.3)
EGFR: 79 ML/MIN/1.73M2 — SIGNIFICANT CHANGE UP
GLUCOSE BLDC GLUCOMTR-MCNC: 179 MG/DL — HIGH (ref 70–99)
GLUCOSE BLDC GLUCOMTR-MCNC: 179 MG/DL — HIGH (ref 70–99)
GLUCOSE BLDC GLUCOMTR-MCNC: 200 MG/DL — HIGH (ref 70–99)
GLUCOSE BLDC GLUCOMTR-MCNC: 237 MG/DL — HIGH (ref 70–99)
GLUCOSE SERPL-MCNC: 256 MG/DL — HIGH (ref 70–99)
HCT VFR BLD CALC: 42.7 % — SIGNIFICANT CHANGE UP (ref 34.5–45)
HGB BLD-MCNC: 13.1 G/DL — SIGNIFICANT CHANGE UP (ref 11.5–15.5)
MAGNESIUM SERPL-MCNC: 2.2 MG/DL — SIGNIFICANT CHANGE UP (ref 1.6–2.6)
MCHC RBC-ENTMCNC: 26.6 PG — LOW (ref 27–34)
MCHC RBC-ENTMCNC: 30.7 GM/DL — LOW (ref 32–36)
MCV RBC AUTO: 86.6 FL — SIGNIFICANT CHANGE UP (ref 80–100)
NRBC # BLD: 0 /100 WBCS — SIGNIFICANT CHANGE UP (ref 0–0)
PHOSPHATE SERPL-MCNC: 3.5 MG/DL — SIGNIFICANT CHANGE UP (ref 2.5–4.5)
PLATELET # BLD AUTO: 308 K/UL — SIGNIFICANT CHANGE UP (ref 150–400)
POTASSIUM SERPL-MCNC: 4.6 MMOL/L — SIGNIFICANT CHANGE UP (ref 3.5–5.3)
POTASSIUM SERPL-SCNC: 4.6 MMOL/L — SIGNIFICANT CHANGE UP (ref 3.5–5.3)
PROT SERPL-MCNC: 7.4 G/DL — SIGNIFICANT CHANGE UP (ref 6–8.3)
RBC # BLD: 4.93 M/UL — SIGNIFICANT CHANGE UP (ref 3.8–5.2)
RBC # FLD: 15 % — HIGH (ref 10.3–14.5)
SODIUM SERPL-SCNC: 135 MMOL/L — SIGNIFICANT CHANGE UP (ref 135–145)
WBC # BLD: 9.06 K/UL — SIGNIFICANT CHANGE UP (ref 3.8–10.5)
WBC # FLD AUTO: 9.06 K/UL — SIGNIFICANT CHANGE UP (ref 3.8–10.5)

## 2024-07-14 PROCEDURE — 99232 SBSQ HOSP IP/OBS MODERATE 35: CPT | Mod: GC

## 2024-07-14 RX ORDER — AZITHROMYCIN 250 MG/1
1 TABLET, FILM COATED ORAL
Qty: 0 | Refills: 0 | DISCHARGE
Start: 2024-07-14

## 2024-07-14 RX ORDER — LINAGLIPTIN 5 MG/1
1 TABLET, FILM COATED ORAL
Qty: 0 | Refills: 0 | DISCHARGE
Start: 2024-07-14

## 2024-07-14 RX ORDER — BENZONATATE 100 MG/1
1 TABLET ORAL
Qty: 1 | Refills: 0
Start: 2024-07-14

## 2024-07-14 RX ADMIN — AZITHROMYCIN 250 MILLIGRAM(S): 250 TABLET, FILM COATED ORAL at 11:52

## 2024-07-14 RX ADMIN — INSULIN LISPRO 2: 100 INJECTION, SOLUTION SUBCUTANEOUS at 09:32

## 2024-07-14 RX ADMIN — Medication 600 MILLIGRAM(S): at 18:07

## 2024-07-14 RX ADMIN — INSULIN LISPRO 4: 100 INJECTION, SOLUTION SUBCUTANEOUS at 13:37

## 2024-07-14 RX ADMIN — ENOXAPARIN SODIUM 40 MILLIGRAM(S): 100 INJECTION SUBCUTANEOUS at 15:28

## 2024-07-14 RX ADMIN — IPRATROPIUM BROMIDE AND ALBUTEROL SULFATE 3 MILLILITER(S): .5; 3 SOLUTION RESPIRATORY (INHALATION) at 11:52

## 2024-07-14 RX ADMIN — IPRATROPIUM BROMIDE AND ALBUTEROL SULFATE 3 MILLILITER(S): .5; 3 SOLUTION RESPIRATORY (INHALATION) at 22:30

## 2024-07-14 RX ADMIN — Medication 2 TABLET(S): at 22:13

## 2024-07-14 RX ADMIN — POLYETHYLENE GLYCOL 3350 17 GRAM(S): 1 POWDER ORAL at 11:53

## 2024-07-14 RX ADMIN — ATORVASTATIN CALCIUM 40 MILLIGRAM(S): 20 TABLET, FILM COATED ORAL at 22:13

## 2024-07-14 RX ADMIN — Medication 600 MILLIGRAM(S): at 06:37

## 2024-07-14 RX ADMIN — INSULIN LISPRO 2: 100 INJECTION, SOLUTION SUBCUTANEOUS at 18:21

## 2024-07-14 RX ADMIN — INSULIN LISPRO 2: 100 INJECTION, SOLUTION SUBCUTANEOUS at 22:34

## 2024-07-14 RX ADMIN — PREDNISONE 40 MILLIGRAM(S): 10 TABLET ORAL at 06:37

## 2024-07-14 RX ADMIN — LINAGLIPTIN 5 MILLIGRAM(S): 5 TABLET, FILM COATED ORAL at 11:52

## 2024-07-14 RX ADMIN — BENZONATATE 100 MILLIGRAM(S): 100 TABLET ORAL at 09:39

## 2024-07-14 RX ADMIN — LOSARTAN POTASSIUM 25 MILLIGRAM(S): 100 TABLET, FILM COATED ORAL at 06:37

## 2024-07-14 RX ADMIN — IPRATROPIUM BROMIDE AND ALBUTEROL SULFATE 3 MILLILITER(S): .5; 3 SOLUTION RESPIRATORY (INHALATION) at 15:28

## 2024-07-14 RX ADMIN — Medication 1 TABLET(S): at 11:52

## 2024-07-14 RX ADMIN — IPRATROPIUM BROMIDE AND ALBUTEROL SULFATE 3 MILLILITER(S): .5; 3 SOLUTION RESPIRATORY (INHALATION) at 06:43

## 2024-07-14 RX ADMIN — Medication 4 MILLILITER(S): at 00:10

## 2024-07-14 RX ADMIN — IPRATROPIUM BROMIDE AND ALBUTEROL SULFATE 3 MILLILITER(S): .5; 3 SOLUTION RESPIRATORY (INHALATION) at 18:07

## 2024-07-14 RX ADMIN — Medication 25 MILLIGRAM(S): at 06:37

## 2024-07-14 NOTE — PROGRESS NOTE ADULT - SUBJECTIVE AND OBJECTIVE BOX
Patient is a 63y old  Female who presents with a chief complaint of COPD exacerbation (13 Jul 2024 11:25)        SUBJECTIVE:  Patient was seen and examined at bedside.    Overnight Events : resting in bed ,  pain when she coughs       Review of systems: 12 point Review of systems negative unless otherwise documented elsewhere in note.     Diet, Consistent Carbohydrate w/Evening Snack (07-12-24 @ 15:17) [Active]      MEDICATIONS:  MEDICATIONS  (STANDING):  albuterol/ipratropium for Nebulization 3 milliLiter(s) Nebulizer every 4 hours  atorvastatin 40 milliGRAM(s) Oral at bedtime  azithromycin   Tablet 250 milliGRAM(s) Oral daily  calcium carbonate 1250 mG  + Vitamin D (OsCal 500 + D) 1 Tablet(s) Oral daily  enoxaparin Injectable 40 milliGRAM(s) SubCutaneous every 24 hours  guaiFENesin  milliGRAM(s) Oral every 12 hours  insulin lispro (ADMELOG) corrective regimen sliding scale   SubCutaneous Before meals and at bedtime  linagliptin 5 milliGRAM(s) Oral daily  losartan 25 milliGRAM(s) Oral daily  metoprolol succinate ER 25 milliGRAM(s) Oral daily  polyethylene glycol 3350 17 Gram(s) Oral daily  predniSONE   Tablet 40 milliGRAM(s) Oral daily  senna 2 Tablet(s) Oral at bedtime    MEDICATIONS  (PRN):  benzocaine/menthol Lozenge 1 Lozenge Oral once PRN Sore Throat  benzonatate 100 milliGRAM(s) Oral every 8 hours PRN Cough  cyclobenzaprine 10 milliGRAM(s) Oral three times a day PRN Muscle Spasm      Allergies    No Known Allergies    Intolerances        OBJECTIVE:  Vital Signs Last 24 Hrs  T(C): 37.2 (14 Jul 2024 11:27), Max: 37.2 (14 Jul 2024 11:27)  T(F): 99 (14 Jul 2024 11:27), Max: 99 (14 Jul 2024 11:27)  HR: 93 (14 Jul 2024 11:27) (80 - 94)  BP: 143/78 (14 Jul 2024 11:27) (142/86 - 154/94)  BP(mean): --  RR: 16 (14 Jul 2024 11:27) (16 - 18)  SpO2: 96% (14 Jul 2024 11:27) (94% - 96%)    Parameters below as of 14 Jul 2024 11:27  Patient On (Oxygen Delivery Method): room air      I&O's Summary      PHYSICAL EXAM:  Gen: Resting in bed at time of exam, not in distress   HEENT: moist mucosa, no lesions   Neck: supple, trachea at midline  CV: RRR, +S1/S2  Pulm: no wheezing , no crackles  no increase in work of breathing  Abd: soft, NTND  Skin: warm and dry, no new rashes   Ext: moving all 4 extremities spontaneously , no edema  ,  Neuro: AOx3, no gross focal neurological deficits  Psych: affect and behavior appropriate, pleasant at time of interview    LABS:                        13.1   9.06  )-----------( 308      ( 14 Jul 2024 10:00 )             42.7     07-14    135  |  96  |  14  ----------------------------<  256<H>  4.6   |  30  |  0.84    Ca    9.7      14 Jul 2024 10:00  Phos  3.5     07-14  Mg     2.2     07-14    TPro  7.4  /  Alb  4.2  /  TBili  0.2  /  DBili  x   /  AST  23  /  ALT  24  /  AlkPhos  104  07-14    LIVER FUNCTIONS - ( 14 Jul 2024 10:00 )  Alb: 4.2 g/dL / Pro: 7.4 g/dL / ALK PHOS: 104 U/L / ALT: 24 U/L / AST: 23 U/L / GGT: x             CAPILLARY BLOOD GLUCOSE      POCT Blood Glucose.: 237 mg/dL (14 Jul 2024 12:58)  POCT Blood Glucose.: 179 mg/dL (14 Jul 2024 09:14)  POCT Blood Glucose.: 132 mg/dL (13 Jul 2024 22:17)  POCT Blood Glucose.: 290 mg/dL (13 Jul 2024 17:04)    Urinalysis Basic - ( 14 Jul 2024 10:00 )    Color: x / Appearance: x / SG: x / pH: x  Gluc: 256 mg/dL / Ketone: x  / Bili: x / Urobili: x   Blood: x / Protein: x / Nitrite: x   Leuk Esterase: x / RBC: x / WBC x   Sq Epi: x / Non Sq Epi: x / Bacteria: x        MICRODATA:      RADIOLOGY/OTHER STUDIES:

## 2024-07-14 NOTE — PROGRESS NOTE ADULT - PROVIDER SPECIALTY LIST ADULT
Quality 130: Documentation Of Current Medications In The Medical Record: Current Medications Documented
Detail Level: Detailed
Hospitalist
Internal Medicine
Quality 226: Preventive Care And Screening: Tobacco Use: Screening And Cessation Intervention: Patient screened for tobacco use and is an ex/non-smoker
Quality 431: Preventive Care And Screening: Unhealthy Alcohol Use - Screening: Patient not identified as an unhealthy alcohol user when screened for unhealthy alcohol use using a systematic screening method
Internal Medicine

## 2024-07-14 NOTE — PROGRESS NOTE ADULT - ASSESSMENT
52 yo F with idiopathic hypertrophic subaortic stenosis (IHSS), likely COPD (per last pulm clinic note) , HTN, DM2, current smoker, here with copd exacerbation 2/2 parainfluenza infection     # Acute  COPD exacerbation   - Azithromycin  - prednisone 40mg x 4 more days ,   - duo nebs standing for now   - start tesselon pearls   - Goal O2 sats 88-92% on RA   [ ] start on maintenance inhaler Symbicort on discharge (needs new prescription )   [ ] outpatient f/u with pulmonology     # T2DM   can get linagliptin while inpatient, restart Jardiance and metformin at discharge     # Idiopathic hypertrophic subaortic stenosis (IHSS),  - continue metoprolol 25mg qd   - maintain euvolemia     # DVT prophylaxis with Lovenox 
3y Female with a PMHx significant for COPD, idiopathic hypertrophic subaortic stenosis, HTN, HLD, DMII, GERD, lumbar disc herniation, current smoker. Admitted to the hospital for increasing oxygen requirements in the setting of COPD exacerbation secondary to parainfluenza virus.        Problem/Plan - 1:  ·  Problem: COPD exacerbation.   ·  Plan: Has moderate COPD. Increased coughing and shortness of breath since Monday 7/8. S/p Duonebs, 2g Magnesium Sulfate, 125mg Methylprednisolone on 7/11 AM in the ED.  Will need F/U w/ pulm.   Plan  - Duonebs Q4H  - Prednisone 40mg QD for 4 days w/ taper (3 days left; should be done on 07/16)   - O2 goal of 88-92%  - weaned off NC today; monitor SPO2   azithromycin (500mg day 1, then 250 mg x4 days) for COPD exacerbation   continue with mucinex, hypertonic saline inhaler and benzocaine/menthol lozenges for increased cough     Problem/Plan - 2:  ·  Problem: Parainfluenza virus infection.   ·  Plan: Found to be +parainfluenza. See plan above     Problem/Plan - 4:  ·  Problem: Current smoker.   ·  Plan: Currently smokes 1 pack per week.   -open to pharmacologic interventions      Problem/Plan - 5:  ·  Problem: Type 2 diabetes mellitus.   ·  Plan: - A1C 7.6%  - SSI  - c/w linagliptin for inpatient duration  - continue to hold PTA jardiance  - continue to Hold PTA metformin.     Problem/Plan - 6:  ·  Problem: HTN (hypertension).   ·  Plan: - Continue PTA losartan.    Problem/Plan - 7:  ·  Problem: HLD (hyperlipidemia).   ·  Plan: - Continue PTA Atorvastatin.       Problem/Plan - 8:  ·  Problem: IHSS (idiopathic hypertrophic subaortic stenosis).   ·  Plan: - Continue PTA metoprolol.       Problem/Plan - 9:  ·  Problem: Back pain.   ·  Plan: - Continue PTA cyclobenzaprine prn.       Problem/Plan - 10:  ·  Problem: Prophylactic measure.   ·  Plan; VTE prophylaxis: lovenox 40mg  Diet: regular  Activity: activity as tolerated.  
NISHANT GARCIA is a 63y Female with a PMHx significant for COPD, idiopathic hypertrophic subaortic stenosis, HTN, HLD, DMII, GERD, lumbar disc herniation, current smoker. Admitted to the hospital for increasing oxygen requirements in the setting of COPD exacerbation secondary to parainfluenza virus.

## 2024-07-15 ENCOUNTER — TRANSCRIPTION ENCOUNTER (OUTPATIENT)
Age: 64
End: 2024-07-15

## 2024-07-15 VITALS — WEIGHT: 160.06 LBS

## 2024-07-15 LAB
GLUCOSE BLDC GLUCOMTR-MCNC: 150 MG/DL — HIGH (ref 70–99)
GLUCOSE BLDC GLUCOMTR-MCNC: 257 MG/DL — HIGH (ref 70–99)

## 2024-07-15 PROCEDURE — 86900 BLOOD TYPING SEROLOGIC ABO: CPT

## 2024-07-15 PROCEDURE — 96375 TX/PRO/DX INJ NEW DRUG ADDON: CPT

## 2024-07-15 PROCEDURE — 87637 SARSCOV2&INF A&B&RSV AMP PRB: CPT

## 2024-07-15 PROCEDURE — 36415 COLL VENOUS BLD VENIPUNCTURE: CPT

## 2024-07-15 PROCEDURE — 83036 HEMOGLOBIN GLYCOSYLATED A1C: CPT

## 2024-07-15 PROCEDURE — 80048 BASIC METABOLIC PNL TOTAL CA: CPT

## 2024-07-15 PROCEDURE — 85025 COMPLETE CBC W/AUTO DIFF WBC: CPT

## 2024-07-15 PROCEDURE — 85027 COMPLETE CBC AUTOMATED: CPT

## 2024-07-15 PROCEDURE — 71045 X-RAY EXAM CHEST 1 VIEW: CPT

## 2024-07-15 PROCEDURE — 87389 HIV-1 AG W/HIV-1&-2 AB AG IA: CPT

## 2024-07-15 PROCEDURE — 84100 ASSAY OF PHOSPHORUS: CPT

## 2024-07-15 PROCEDURE — 94640 AIRWAY INHALATION TREATMENT: CPT

## 2024-07-15 PROCEDURE — 84132 ASSAY OF SERUM POTASSIUM: CPT

## 2024-07-15 PROCEDURE — 96374 THER/PROPH/DIAG INJ IV PUSH: CPT

## 2024-07-15 PROCEDURE — 99285 EMERGENCY DEPT VISIT HI MDM: CPT | Mod: 25

## 2024-07-15 PROCEDURE — 86901 BLOOD TYPING SEROLOGIC RH(D): CPT

## 2024-07-15 PROCEDURE — 86850 RBC ANTIBODY SCREEN: CPT

## 2024-07-15 PROCEDURE — 80053 COMPREHEN METABOLIC PANEL: CPT

## 2024-07-15 PROCEDURE — 0225U NFCT DS DNA&RNA 21 SARSCOV2: CPT

## 2024-07-15 PROCEDURE — 83735 ASSAY OF MAGNESIUM: CPT

## 2024-07-15 PROCEDURE — 82962 GLUCOSE BLOOD TEST: CPT

## 2024-07-15 RX ORDER — ATORVASTATIN CALCIUM 20 MG/1
1 TABLET, FILM COATED ORAL
Qty: 0 | Refills: 0 | DISCHARGE
Start: 2024-07-15

## 2024-07-15 RX ORDER — METOPROLOL TARTRATE 50 MG
1 TABLET ORAL
Qty: 0 | Refills: 0 | DISCHARGE
Start: 2024-07-15

## 2024-07-15 RX ORDER — CALCIUM CARBONATE/VITAMIN D2 250 MG-125
0 TABLET ORAL
Refills: 0 | DISCHARGE

## 2024-07-15 RX ORDER — PREDNISONE 10 MG/1
2 TABLET ORAL
Qty: 0 | Refills: 0 | DISCHARGE
Start: 2024-07-15 | End: 2024-07-16

## 2024-07-15 RX ORDER — LOSARTAN POTASSIUM 100 MG/1
1 TABLET, FILM COATED ORAL
Qty: 0 | Refills: 0 | DISCHARGE
Start: 2024-07-15

## 2024-07-15 RX ORDER — LOSARTAN POTASSIUM 100 MG/1
1 TABLET, FILM COATED ORAL
Refills: 0 | DISCHARGE

## 2024-07-15 RX ORDER — IPRATROPIUM BROMIDE AND ALBUTEROL SULFATE .5; 3 MG/3ML; MG/3ML
3 SOLUTION RESPIRATORY (INHALATION)
Qty: 0 | Refills: 0 | DISCHARGE
Start: 2024-07-15

## 2024-07-15 RX ORDER — PREDNISONE 10 MG/1
2 TABLET ORAL
Qty: 2 | Refills: 0
Start: 2024-07-15 | End: 2024-07-15

## 2024-07-15 RX ORDER — AZITHROMYCIN 250 MG/1
1 TABLET, FILM COATED ORAL
Qty: 0 | Refills: 0 | DISCHARGE
Start: 2024-07-15 | End: 2024-07-16

## 2024-07-15 RX ORDER — METOPROLOL TARTRATE 50 MG
1 TABLET ORAL
Refills: 0 | DISCHARGE

## 2024-07-15 RX ORDER — IPRATROPIUM BROMIDE AND ALBUTEROL SULFATE .5; 3 MG/3ML; MG/3ML
3 SOLUTION RESPIRATORY (INHALATION)
Qty: 3 | Refills: 0
Start: 2024-07-15

## 2024-07-15 RX ORDER — AZITHROMYCIN 250 MG/1
1 TABLET, FILM COATED ORAL
Qty: 1 | Refills: 0
Start: 2024-07-15 | End: 2024-07-15

## 2024-07-15 RX ORDER — ATORVASTATIN CALCIUM 20 MG/1
1 TABLET, FILM COATED ORAL
Refills: 0 | DISCHARGE

## 2024-07-15 RX ADMIN — IPRATROPIUM BROMIDE AND ALBUTEROL SULFATE 3 MILLILITER(S): .5; 3 SOLUTION RESPIRATORY (INHALATION) at 09:37

## 2024-07-15 RX ADMIN — AZITHROMYCIN 250 MILLIGRAM(S): 250 TABLET, FILM COATED ORAL at 13:11

## 2024-07-15 RX ADMIN — ENOXAPARIN SODIUM 40 MILLIGRAM(S): 100 INJECTION SUBCUTANEOUS at 13:11

## 2024-07-15 RX ADMIN — Medication 600 MILLIGRAM(S): at 07:07

## 2024-07-15 RX ADMIN — PREDNISONE 40 MILLIGRAM(S): 10 TABLET ORAL at 07:04

## 2024-07-15 RX ADMIN — POLYETHYLENE GLYCOL 3350 17 GRAM(S): 1 POWDER ORAL at 13:10

## 2024-07-15 RX ADMIN — IPRATROPIUM BROMIDE AND ALBUTEROL SULFATE 3 MILLILITER(S): .5; 3 SOLUTION RESPIRATORY (INHALATION) at 13:10

## 2024-07-15 RX ADMIN — Medication 25 MILLIGRAM(S): at 07:04

## 2024-07-15 RX ADMIN — IPRATROPIUM BROMIDE AND ALBUTEROL SULFATE 3 MILLILITER(S): .5; 3 SOLUTION RESPIRATORY (INHALATION) at 01:22

## 2024-07-15 RX ADMIN — IPRATROPIUM BROMIDE AND ALBUTEROL SULFATE 3 MILLILITER(S): .5; 3 SOLUTION RESPIRATORY (INHALATION) at 07:04

## 2024-07-15 RX ADMIN — Medication 1 TABLET(S): at 13:11

## 2024-07-15 RX ADMIN — LINAGLIPTIN 5 MILLIGRAM(S): 5 TABLET, FILM COATED ORAL at 13:11

## 2024-07-15 RX ADMIN — LOSARTAN POTASSIUM 25 MILLIGRAM(S): 100 TABLET, FILM COATED ORAL at 07:04

## 2024-07-15 RX ADMIN — INSULIN LISPRO 6: 100 INJECTION, SOLUTION SUBCUTANEOUS at 13:10

## 2024-07-15 NOTE — DIETITIAN INITIAL EVALUATION ADULT - PROBLEM SELECTOR PLAN 5
- F/U A1C  - SSI  - Start linagliptin for inpatient duration  - Hold PTA jardiance  - Hold PTA metformin

## 2024-07-15 NOTE — DIETITIAN INITIAL EVALUATION ADULT - OTHER INFO
Per H&P: Leanne Fournier is a 63 year old female with a PMHx significant for COPD, idiopathic hypertrophic subaortic stenosis, HTN, HLD, DMII, GERD, lumbar disc herniation, current smoker. She presented to the ED the morning of 7/11/24 for increasing cough and shortness of breath. Her symptoms started on Monday 7/8 and have progressively been worsening. Has been taking at home inhalers and anti-cough medications at home with little benefit. Does not require O2 at home. Takes her inhaler daily. Now breathing well on 2L NC. Endorses cough, SOB, decreased fluid intake, constipation, rib pain. Denies fever, chills.     Met with pt this AM at bedside. Pt was seated upright and able to articulate her nutrition hx well. No known food allergies nor food intolerances reported. Pt reported good appetite PTA, mentioned it's currently alright, stated appetite usually lower in the summer, specifically July due to heat. Pt denied following any specific diet at home. Pt denied dry mouth/mouth sores and chewing/swallowing difficulties. Pt denied nausea/vomiting/diarrhea/constipation, stated last BM 7/14. Pt reported usual body weight ~15 pounds "a few months" ago. RD reviewed Carbohydrate Consistent diet including sources of carbohydrates, portion sizes, pairing protein with carbohydrates, limiting sugar sweetened beverages in diet and the importance of consistent eating pattern to help optimize glycemic control. Pt was accepting to nutrition education and asking appropriate questions. Pt without overt visual signs of muscle or subcutaneous fat wasting.    *Note: Pt with hx T2DM. A1C 7.6% (7/12).  - POCT BG x 24 hours:  POCT Blood Glucose.: 257 mg/dL (15 Jul 2024 13:00)  POCT Blood Glucose.: 150 mg/dL (15 Jul 2024 09:07)  POCT Blood Glucose.: 200 mg/dL (14 Jul 2024 22:27)  POCT Blood Glucose.: 179 mg/dL (14 Jul 2024 18:18)

## 2024-07-15 NOTE — DIETITIAN INITIAL EVALUATION ADULT - PERTINENT MEDS FT
MEDICATIONS  (STANDING):  albuterol/ipratropium for Nebulization 3 milliLiter(s) Nebulizer every 4 hours  atorvastatin 40 milliGRAM(s) Oral at bedtime  azithromycin   Tablet 250 milliGRAM(s) Oral daily  calcium carbonate 1250 mG  + Vitamin D (OsCal 500 + D) 1 Tablet(s) Oral daily  enoxaparin Injectable 40 milliGRAM(s) SubCutaneous every 24 hours  guaiFENesin  milliGRAM(s) Oral every 12 hours  insulin lispro (ADMELOG) corrective regimen sliding scale   SubCutaneous Before meals and at bedtime  linagliptin 5 milliGRAM(s) Oral daily  losartan 25 milliGRAM(s) Oral daily  metoprolol succinate ER 25 milliGRAM(s) Oral daily  polyethylene glycol 3350 17 Gram(s) Oral daily  senna 2 Tablet(s) Oral at bedtime    MEDICATIONS  (PRN):  benzocaine/menthol Lozenge 1 Lozenge Oral once PRN Sore Throat  benzonatate 100 milliGRAM(s) Oral every 8 hours PRN Cough  cyclobenzaprine 10 milliGRAM(s) Oral three times a day PRN Muscle Spasm

## 2024-07-15 NOTE — DIETITIAN INITIAL EVALUATION ADULT - OTHER CALCULATIONS
*Using +10% ideal body weight (107.25 pounds) as pt is >120% ideal body weight. Needs adjusted for COPD. ideal body weight: 97.2 pounds; % ideal body weight: 164%

## 2024-07-15 NOTE — DISCHARGE NOTE NURSING/CASE MANAGEMENT/SOCIAL WORK - PATIENT PORTAL LINK FT
You can access the FollowMyHealth Patient Portal offered by [facility] by registering at the following website: [email or web address]. By joining [employer]’s FollowMyHealth portal, you will also be able to view your health information using other applications (apps) compatible with our system.

## 2024-07-15 NOTE — DIETITIAN INITIAL EVALUATION ADULT - PERTINENT LABORATORY DATA
07-14    135  |  96  |  14  ----------------------------<  256<H>  4.6   |  30  |  0.84    Ca    9.7      14 Jul 2024 10:00  Phos  3.5     07-14  Mg     2.2     07-14    TPro  7.4  /  Alb  4.2  /  TBili  0.2  /  DBili  x   /  AST  23  /  ALT  24  /  AlkPhos  104  07-14  POCT Blood Glucose.: 257 mg/dL (07-15-24 @ 13:00)  A1C with Estimated Average Glucose Result: 7.6 % (07-12-24 @ 05:30)  A1C with Estimated Average Glucose Result: 7.6 % (07-11-24 @ 05:58)

## 2024-07-15 NOTE — DIETITIAN INITIAL EVALUATION ADULT - ORAL INTAKE PTA/DIET HISTORY
No known food allergies nor food intolerances reported. Pt reported good appetite PTA, appetite usually lower in the summer, specifically July due to heat. Pt denied following any specific diet at home.

## 2024-07-15 NOTE — DIETITIAN INITIAL EVALUATION ADULT - ADD RECOMMEND
1. Continue with Consistent Carbohydrate (evening snack) diet  - RD will continue to monitor POCT BG  - Insulin per team  - Honor food preferences, as medically able  2. Appreciate weekly weight trends  3. Continue with current bowel regimen, prn  4. Ongoing T2DM diet education   5. Continue with Vitamin D supplementation

## 2024-07-15 NOTE — DISCHARGE NOTE NURSING/CASE MANAGEMENT/SOCIAL WORK - NSDCFUADDAPPT_GEN_ALL_CORE_FT
Please bring your Insurance card, Photo ID and Discharge paperwork to the following appointment:    (1) Please follow up with your Pulmonary Medicine Provider, Dr. Eloise Ramirez at [address] [place] [address], [address], [place] [address] on 7/23/2024 at 11:00am.    Appointment was scheduled by Ms. ANIBAL Paz, Referral Coordinator.

## 2024-07-15 NOTE — DISCHARGE NOTE NURSING/CASE MANAGEMENT/SOCIAL WORK - NSDCPEFALRISK_GEN_ALL_CORE
Problem: Patient Centered Care  Goal: Patient preferences are identified and integrated in the patient's plan of care  Description  Interventions:  - What would you like us to know as we care for you? I live at home with my daughter.  Please keep her upda brush  - Limit straining and forceful nose blowing  3/26/2020 1848 by Rufus Martin RN  Outcome: Progressing  3/26/2020 1848 by Rufus Martin RN  Outcome: Progressing     Problem: CARDIOVASCULAR - ADULT  Goal: Maintains optimal cardiac output and hem Assess and instruct to report SOB or any respiratory difficulty  - Respiratory Therapy support as indicated  - Manage/alleviate anxiety  - Monitor for signs/symptoms of CO2 retention  3/26/2020 1848 by Lori Barraza RN  Outcome: Progressing  3/26/2020 1 of seizures  Description  INTERVENTIONS  - Monitor for seizure activity  - Administer anti-seizure medications as ordered  - Monitor neurological status  3/26/2020 1848 by Lianna Perea RN  Outcome: Progressing  3/26/2020 1848 by ROLDAN Rogers Progressing     Problem: Diabetes/Glucose Control  Goal: Glucose maintained within prescribed range  Description  INTERVENTIONS:  - Monitor Blood Glucose as ordered  - Assess for signs and symptoms of hyperglycemia and hypoglycemia  - Administer ordered me For information on Fall & Injury Prevention, visit: [email or web address] OR  [email or web address] OR  https://www.cdc.gov/steadi/patient.html

## 2024-07-15 NOTE — DIETITIAN INITIAL EVALUATION ADULT - PROBLEM SELECTOR PLAN 1
Has moderate COPD. Increased coughing and shortness of breath since Monday 7/8. S/p Duonebs, 2g Magnesium Sulfate, 125mg Methylprednisolone on 7/11 AM in the ED. Now with improved work of breathing and on 2L NC. Will need F/U w/ pulm.     - Duonebs Q4H  - Prednisone 40mg QD for 4 days w/ taper  - O2 goal of 88-92%  - Ween nasal cannula as tolerated

## 2024-07-16 RX ORDER — IPRATROPIUM BROMIDE AND ALBUTEROL SULFATE .5; 3 MG/3ML; MG/3ML
3 SOLUTION RESPIRATORY (INHALATION)
Qty: 3 | Refills: 2
Start: 2024-07-16 | End: 2024-10-13

## 2024-07-16 RX ORDER — PREDNISONE 10 MG/1
2 TABLET ORAL
Qty: 2 | Refills: 0
Start: 2024-07-16 | End: 2024-07-16

## 2024-07-16 RX ORDER — AZITHROMYCIN 250 MG/1
1 TABLET, FILM COATED ORAL
Qty: 1 | Refills: 0
Start: 2024-07-16 | End: 2024-07-16

## 2024-07-23 ENCOUNTER — APPOINTMENT (OUTPATIENT)
Dept: PULMONOLOGY | Facility: CLINIC | Age: 64
End: 2024-07-23
Payer: MEDICAID

## 2024-07-23 VITALS
OXYGEN SATURATION: 97 % | BODY MASS INDEX: 30.24 KG/M2 | HEART RATE: 94 BPM | WEIGHT: 150 LBS | HEIGHT: 59 IN | TEMPERATURE: 97.7 F | SYSTOLIC BLOOD PRESSURE: 120 MMHG | DIASTOLIC BLOOD PRESSURE: 84 MMHG

## 2024-07-23 PROCEDURE — 99213 OFFICE O/P EST LOW 20 MIN: CPT | Mod: 25

## 2024-07-23 PROCEDURE — 94010 BREATHING CAPACITY TEST: CPT

## 2024-07-23 NOTE — CONSULT LETTER
[Dear  ___] : Dear  [unfilled], [Courtesy Letter:] : I had the pleasure of seeing your patient, [unfilled], in my office today. [Please see my note below.] : Please see my note below. [Consult Closing:] : Thank you very much for allowing me to participate in the care of this patient.  If you have any questions, please do not hesitate to contact me. [Sincerely,] : Sincerely, [FreeTextEntry2] : Tashi Mcdermott MD\par  [address] \[place] [address] [FreeTextEntry3] : Eloise Ramirez MD, FCCP\par

## 2024-07-23 NOTE — ASSESSMENT
[FreeTextEntry1] : Data reviewed:  LDCT [facility] 4/2021: no suspicious nodules LDCT R 11/2022 personally reviewed : no suspicious nodules PA/lat CXR R 5/2023 personally reviewed : clear  Alex 4/2022: mod obstruction, FEV1 58% Alex 7/23/2024: mod-sev restriction, FEV1 50%  Impression: COPD exacerbation due to parainfluenza July 2024 Tobacco dependence  Plan: She's back to being asymptomatic. Will just keep her on albuterol prn and get full PFT next visit. She is booked for LDCT at 3pm today. Will see her back in 2-3 mos.

## 2024-07-23 NOTE — HISTORY OF PRESENT ILLNESS
[Current] : current [TextBox_4] : 05/17/2023: Follow up visit for this patient seen by Dr Santo in 2017 and again 2022. She has obstructed spirometry. She doesn't know why she's here. She has some cough. She has some dyspnea on exertion, nothing that limits her. No pulmonary exacerbations. She is smoking 3-4 cigs a day. She is having shoulder surgery on 5/26. I discovered this by asking her why she had a recent CXR at [facility]. Her only previous surgeries are C sections and a hernia repair with no pulmonary complications. No VTE hx.  7/23/2024: Comes in now after being admitted at [facility] for COPD exacerbation due to parainfluenza from July 11-15, 2024. Continuing to smoke about 3 cigs a day. Hospital notes reflect her being on Symbicort, and she doesn't know, but she wasn't on any standing inhaler before hospitalization nor is she now. She is using albuterol prn, but not since leaving the hospital. Also has not used nebs since leaving hospital. Did finish her steroids and azithro at home and feels good now.

## 2024-07-24 DIAGNOSIS — J45.901 UNSPECIFIED ASTHMA WITH (ACUTE) EXACERBATION: ICD-10-CM

## 2024-07-24 DIAGNOSIS — K59.00 CONSTIPATION, UNSPECIFIED: ICD-10-CM

## 2024-07-24 DIAGNOSIS — Z11.52 ENCOUNTER FOR SCREENING FOR COVID-19: ICD-10-CM

## 2024-07-24 DIAGNOSIS — Z79.899 OTHER LONG TERM (CURRENT) DRUG THERAPY: ICD-10-CM

## 2024-07-24 DIAGNOSIS — E66.01 MORBID (SEVERE) OBESITY DUE TO EXCESS CALORIES: ICD-10-CM

## 2024-07-24 DIAGNOSIS — M54.9 DORSALGIA, UNSPECIFIED: ICD-10-CM

## 2024-07-24 DIAGNOSIS — I10 ESSENTIAL (PRIMARY) HYPERTENSION: ICD-10-CM

## 2024-07-24 DIAGNOSIS — E11.9 TYPE 2 DIABETES MELLITUS WITHOUT COMPLICATIONS: ICD-10-CM

## 2024-07-24 DIAGNOSIS — J44.1 CHRONIC OBSTRUCTIVE PULMONARY DISEASE WITH (ACUTE) EXACERBATION: ICD-10-CM

## 2024-07-24 DIAGNOSIS — Z79.84 LONG TERM (CURRENT) USE OF ORAL HYPOGLYCEMIC DRUGS: ICD-10-CM

## 2024-07-24 DIAGNOSIS — K21.9 GASTRO-ESOPHAGEAL REFLUX DISEASE WITHOUT ESOPHAGITIS: ICD-10-CM

## 2024-07-24 DIAGNOSIS — E78.5 HYPERLIPIDEMIA, UNSPECIFIED: ICD-10-CM

## 2024-07-24 DIAGNOSIS — F17.210 NICOTINE DEPENDENCE, CIGARETTES, UNCOMPLICATED: ICD-10-CM

## 2024-07-24 DIAGNOSIS — B34.8 OTHER VIRAL INFECTIONS OF UNSPECIFIED SITE: ICD-10-CM

## 2024-07-24 DIAGNOSIS — I42.1 OBSTRUCTIVE HYPERTROPHIC CARDIOMYOPATHY: ICD-10-CM

## 2024-08-19 ENCOUNTER — EMERGENCY (EMERGENCY)
Facility: HOSPITAL | Age: 64
LOS: 1 days | Discharge: HOME | End: 2024-08-19
Attending: STUDENT IN AN ORGANIZED HEALTH CARE EDUCATION/TRAINING PROGRAM | Admitting: STUDENT IN AN ORGANIZED HEALTH CARE EDUCATION/TRAINING PROGRAM
Payer: COMMERCIAL

## 2024-08-19 VITALS
HEART RATE: 99 BPM | TEMPERATURE: 98 F | DIASTOLIC BLOOD PRESSURE: 75 MMHG | RESPIRATION RATE: 17 BRPM | SYSTOLIC BLOOD PRESSURE: 115 MMHG | OXYGEN SATURATION: 96 %

## 2024-08-19 VITALS
RESPIRATION RATE: 18 BRPM | OXYGEN SATURATION: 95 % | HEIGHT: 59 IN | WEIGHT: 164.02 LBS | SYSTOLIC BLOOD PRESSURE: 117 MMHG | DIASTOLIC BLOOD PRESSURE: 78 MMHG | TEMPERATURE: 98 F | HEART RATE: 103 BPM

## 2024-08-19 DIAGNOSIS — Z20.822 CONTACT WITH AND (SUSPECTED) EXPOSURE TO COVID-19: ICD-10-CM

## 2024-08-19 DIAGNOSIS — J06.9 ACUTE UPPER RESPIRATORY INFECTION, UNSPECIFIED: ICD-10-CM

## 2024-08-19 DIAGNOSIS — R51.9 HEADACHE, UNSPECIFIED: ICD-10-CM

## 2024-08-19 DIAGNOSIS — J02.9 ACUTE PHARYNGITIS, UNSPECIFIED: ICD-10-CM

## 2024-08-19 DIAGNOSIS — J44.9 CHRONIC OBSTRUCTIVE PULMONARY DISEASE, UNSPECIFIED: ICD-10-CM

## 2024-08-19 LAB
FLUAV AG NPH QL: SIGNIFICANT CHANGE UP
FLUBV AG NPH QL: SIGNIFICANT CHANGE UP
RSV RNA NPH QL NAA+NON-PROBE: SIGNIFICANT CHANGE UP
S PYO AG SPEC QL IA: NEGATIVE — SIGNIFICANT CHANGE UP
SARS-COV-2 RNA SPEC QL NAA+PROBE: SIGNIFICANT CHANGE UP

## 2024-08-19 PROCEDURE — 99284 EMERGENCY DEPT VISIT MOD MDM: CPT | Mod: 25

## 2024-08-19 PROCEDURE — 99283 EMERGENCY DEPT VISIT LOW MDM: CPT | Mod: 25

## 2024-08-19 PROCEDURE — 87637 SARSCOV2&INF A&B&RSV AMP PRB: CPT

## 2024-08-19 PROCEDURE — 87880 STREP A ASSAY W/OPTIC: CPT

## 2024-08-19 PROCEDURE — 87081 CULTURE SCREEN ONLY: CPT

## 2024-08-19 PROCEDURE — 99284 EMERGENCY DEPT VISIT MOD MDM: CPT

## 2024-08-19 PROCEDURE — 71046 X-RAY EXAM CHEST 2 VIEWS: CPT

## 2024-08-19 PROCEDURE — 71046 X-RAY EXAM CHEST 2 VIEWS: CPT | Mod: 26

## 2024-08-19 RX ORDER — BENZONATATE 100 MG/1
1 CAPSULE, LIQUID FILLED ORAL
Qty: 21 | Refills: 0
Start: 2024-08-19 | End: 2024-08-25

## 2024-08-19 NOTE — ED ADULT NURSE NOTE - OBJECTIVE STATEMENT
Pt A&OX4 and able to speak in complete sentences. Pt breathing even and unlabored with equal chest rise and fall. Pt arrived d/t sore throat and cough over the past week. Pt hx of HTN, asthma, COPD. Pt denies cp, fevers, chills, n, v, lightheadedness, dizziness,

## 2024-08-19 NOTE — ED PROVIDER NOTE - ENMT, MLM
Airway patent, pharynx mucosa pink, moist, 1+ tonsil edema b/l, no exudate, uvula midline, no cervical lymphadenopathy

## 2024-08-19 NOTE — ED PROVIDER NOTE - ATTENDING APP SHARED VISIT CONTRIBUTION OF CARE
64 year old female with history of COPD presenting with suspected viral URI. Vitals reassuring, no oxygen requirement/WOB/tachypnea/wheeze, not in COPD exacerbation. CXR without focal infiltrate. DC with supportive care and PMD f/u.

## 2024-08-19 NOTE — ED PROVIDER NOTE - PATIENT PORTAL LINK FT
You can access the FollowMyHealth Patient Portal offered by [facility] by registering at the following website: [email or web address]. By joining [employer]’s FollowMyHealth portal, you will also be able to view your health information using other applications (apps) compatible with our system.

## 2024-08-19 NOTE — ED PROVIDER NOTE - NS ED ATTENDING STATEMENT MOD
ED General





- General


Chief Complaint: Nausea/Vomiting


Stated Complaint: CHEST PAIN


Time Seen by Provider: 06/16/17 21:50


Mode of Arrival: Medic


Information source: Patient, Friend


TRAVEL OUTSIDE OF THE [place] IN LAST 30 DAYS: No





- HPI


Notes: 


25 year old female, 14 weeks pregnant, comes by EMS for chief complaint of 

vomiting and chest pain, states she has been evaluated multiple times for this (

also in [place] 6/15/17).  Patient reports at that time she had a negative 

chest x-ray.





She describes vomiting 10 times without blood.  She reports no vaginal 

discharge or bleeding.





Denies fever, hematemesis, vaginal bleeding, lower abdominal pain. Pending 

appointment at [facility]. 





Patient describes her chest pain is midsternal and worse after vomiting.  She 

denies any difficulty breathing.  She reports no significant abdominal pain.





Medications Carafate Phenergan and Zofran and potassium.





Family history no gallbladder disease.





Patient does have a history of an esophageal disorder, describing it as a 

pocket of air in her esophagus in between her lungs and her stomach.  I 

question whether not this was a hiatal hernia but the could not comment 

further.  She never had upper endoscopy nor any surgical procedure performed 

for this reported abnormality.











- Related Data


Allergies/Adverse Reactions: 


 





No Known Allergies Allergy (Verified 06/12/17 15:16)


 











Past Medical History





- General


Information source: Patient





- Social History


Smoking Status: Never Smoker


Frequency of alcohol use: None


Drug Abuse: None


Lives with: Family


Family History: None


Patient has suicidal ideation: No


Patient has homicidal ideation: No


Renal/ Medical History: Denies: Hx Peritoneal Dialysis





- Immunizations


Hx Diphtheria, Pertussis, Tetanus Vaccination: No - unknown





Review of Systems





- Review of Systems


Notes: 


REVIEW OF SYSTEMS:


CONSTITUTIONAL :  Denies fever,  chills, or sweats.  Denies recent illness.


EENT:   Denies eye, ear, throat, or mouth pain or symptoms.  Denies nasal or 

sinus congestion or discharge.  Denies throat, tongue, or mouth swelling or 

difficulty swallowing.


CARDIOVASCULAR:  Denies palpitations or racing or irregular heart beat.  Denies 

ankle edema.


RESPIRATORY:  Denies cough, cold, or chest congestion.  Denies shortness of 

breath, difficulty breathing, or wheezing.


GASTROINTESTINAL:  Denies abdominal pain or distention.  Denies diarrhea.  

Denies blood in vomitus, stools, or per rectum.  Denies black, tarry stools.  

Denies constipation. 


GENITOURINARY:  Denies difficulty urinating, painful urination, burning, 

frequency, blood in urine, or discharge.


FEMALE  GENITOURINARY:  Denies vaginal bleeding, heavy or abnormal periods, 

irregular periods.  Denies vaginal discharge or odor. 


MUSCULOSKELETAL:  Denies back or neck pain or stiffness.  Denies joint pain or 

swelling.


SKIN:   Denies rash, lesions or sores.


HEMATOLOGIC :   Denies easy bruising or bleeding.


LYMPHATIC:  Denies swollen, enlarged glands.


NEUROLOGICAL:  Denies confusion or altered mental status.  Denies passing out 

or loss of consciousness.  Denies dizziness or lightheadedness.  Denies 

headache.  Denies weakness or paralysis or loss of use of either side.  Denies 

problems with gait or speech.  Denies sensory loss, numbness, or tingling.  

Denies seizures.


PSYCHIATRIC:  Denies anxiety or stress.  Denies depression, suicidal ideation, 

or homicidal ideation.





ALL OTHER SYSTEMS REVIEWED AND NEGATIVE.








Dictation was performed using Dragon voice recognition software





Physical Exam





- Vital signs


Vitals: 


 











Temp Pulse Resp BP Pulse Ox


 


 98.8 F   97   20   129/85 H  99 


 


 06/16/17 19:55  06/16/17 19:55  06/16/17 19:55  06/16/17 19:55  06/16/17 19:55














- Notes


Notes: 


PHYSICAL EXAMINATION:





GENERAL: Well-appearing, well-nourished and in no acute distress.





HEAD: Atraumatic, normocephalic.





EYES: Pupils equal round and reactive to light, extraocular movements intact, 

conjunctiva are normal.





ENT: Nares patent, oropharynx clear without exudates.  Dry mucous membranes.





NECK: Normal range of motion, supple without lymphadenopathy





LUNGS: Breath sounds clear to auscultation bilaterally and equal.  No wheezes 

rales or rhonchi.





HEART: Regular rate and rhythm without murmurs.  Mild midsternal discomfort to 

the chest on palpation.





ABDOMEN: Soft, nondistended abdomen.  No guarding, no rebound.  No masses 

appreciated.  Minimal if any midepigastric discomfort.  Negative Castellanos's.  No 

obvious hepatosplenomegaly.





Female : deferred





Musculoskeletal: Normal range of motion, no pitting or edema.  No cyanosis.





NEUROLOGICAL: Cranial nerves grossly intact.  Normal speech, normal gait.  

Normal sensory, motor exams





PSYCH: Normal mood, normal affect.





SKIN: Warm, Dry, normal turgor, no rashes or lesions noted.





Course





- Re-evaluation


Re-evalutation: 


06/16/17 23:32


Patient was given IV Benadryl Reglan normal saline bolus and IV Pepcid.





06/17/17 03:03


Repeat exam patient denied any pain and was able to tolerate p.o. fluids.





Patient was given additional normal saline with 20 KCl 1 L.





Patient showed somewhat low bicarb level with urinary ketosis, and the patient 

was advised to be admitted for further rehydration and nausea control and 

further evaluation.





Patient however politely declined and requested to go home.





We will instruct the patient to take her metoclopramide as instructed, and we 

will add in ranitidine for acid control.  Patient is told to observe a bland 

diet.





No obvious evidence for urinary tract infection or renal insufficiency or 

significant anemia.





06/17/17 04:04





Patient was given a p.o. fluid challenge, but she failed and felt nauseated 

again and finally agreed to admission for further evaluation.





I question underlying gastritis, less likely Zenker's diverticulum.   possible 

hiatal hernia given the discomfort.





There is no evidence for acute MI or cardiac ischemia or pancreatitis or 

hepatitis or GI bleed.  Given the recent reported negative chest x-ray from 2 

days ago in [place], I do not suspect cardiomyopathy versus congestive 

failure versus PE given the chest pain that occurs after vomiting that is 

nonpleuritic.





06/17/17 04:05





Discussed with Dr. Arevalo and she agrees to evaluate the patient further for 

potential admission. 





- Vital Signs


Vital signs: 


 











Temp Pulse Resp BP Pulse Ox


 


 98.8 F   97   20   129/85 H  99 


 


 06/16/17 19:55  06/16/17 19:55  06/16/17 19:55  06/16/17 19:55  06/16/17 19:55














- Laboratory


Result Diagrams: 


 06/16/17 21:55





 06/16/17 21:55


Laboratory results interpreted by me: 


 











  06/16/17 06/16/17 06/17/17





  21:55 21:55 01:17


 


Seg Neutrophils %  80.8 H  


 


Carbon Dioxide   16 L 


 


BUN   4 L 


 


Creatinine   0.47 L 


 


Direct Bilirubin   0.5 H 


 


Total Protein   8.3 H 


 


Urine Protein    30 H


 


Urine Ketones    80 H


 


Urine Urobilinogen    2.0 H














- EKG Interpretation by Me


EKG shows normal: Sinus rhythm


Additional EKG results interpreted by me: 





06/16/17 23:32


EKG as interpreted by me showed normal sinus rhythm heart rate of 82.  There is 

no gross evidence for acute MI or ischemia identified.  There is no change from 

previous EKG reviewed from 6/12/17.





Critical Care Note





- Critical Care Note


Total time excluding time spent on procedures (mins): 39





Discharge





- Discharge


Clinical Impression: 


 Hyperemesis gravidarum, Dehydration, Esophagitis





Referrals: 


YONATHAN SOW MD [Primary Care Provider] - Follow up as needed This was a shared visit with the BETTYE. I reviewed and verified the documentation.

## 2024-08-19 NOTE — ED PROVIDER NOTE - NS_EDPROVIDERDISPOUSERTYPE_ED_A_ED
Called Dr. Rosa's office to notify him that patient needs a pre operative H&P.  Awaiting call back for further instructions.  
Attending Attestation (For Attendings USE Only)...

## 2024-08-19 NOTE — ED PROVIDER NOTE - CLINICAL SUMMARY MEDICAL DECISION MAKING FREE TEXT BOX
63 y/o f presents c/o sore throat, cough x 1 week.  Pt afebrile in ED, no resp distress, clear lungs on exam.  CXR and strep swab neg, likely viral etiology of sx.  Pt stable for d/c, recommend supportive care, rx tessalon perles, f/u pmd, return to ED if sx worsen.

## 2024-08-19 NOTE — ED ADULT TRIAGE NOTE - PATIENT'S PREFERRED PRONOUN
- continue q h s   Lantus  - insulin sliding scale for additional coverage  - hypoglycemia protocol Her/She

## 2024-08-19 NOTE — ED PROVIDER NOTE - OBJECTIVE STATEMENT
63 y/o f hx COPD presents c/o sore throat, cough x 1 week.  Pt stating she feels some chest congestion, bringing up whitish sputum.  Denies fever, chills, difficulty breathing/swallowing, CP, all other ROS negative.

## 2024-08-20 LAB
CULTURE RESULTS: SIGNIFICANT CHANGE UP
SPECIMEN SOURCE: SIGNIFICANT CHANGE UP

## 2024-09-21 ENCOUNTER — EMERGENCY (EMERGENCY)
Facility: HOSPITAL | Age: 64
LOS: 1 days | Discharge: HOME | End: 2024-09-21
Attending: EMERGENCY MEDICINE | Admitting: EMERGENCY MEDICINE
Payer: MEDICAID

## 2024-09-21 VITALS
HEART RATE: 105 BPM | DIASTOLIC BLOOD PRESSURE: 83 MMHG | RESPIRATION RATE: 20 BRPM | SYSTOLIC BLOOD PRESSURE: 147 MMHG | OXYGEN SATURATION: 100 %

## 2024-09-21 VITALS
DIASTOLIC BLOOD PRESSURE: 84 MMHG | WEIGHT: 160.06 LBS | OXYGEN SATURATION: 96 % | HEIGHT: 59 IN | TEMPERATURE: 100 F | RESPIRATION RATE: 22 BRPM | SYSTOLIC BLOOD PRESSURE: 161 MMHG | HEART RATE: 96 BPM

## 2024-09-21 DIAGNOSIS — F17.200 NICOTINE DEPENDENCE, UNSPECIFIED, UNCOMPLICATED: ICD-10-CM

## 2024-09-21 DIAGNOSIS — J44.1 CHRONIC OBSTRUCTIVE PULMONARY DISEASE WITH (ACUTE) EXACERBATION: ICD-10-CM

## 2024-09-21 DIAGNOSIS — R06.2 WHEEZING: ICD-10-CM

## 2024-09-21 LAB
ANION GAP SERPL CALC-SCNC: 10 MMOL/L — SIGNIFICANT CHANGE UP (ref 5–17)
BASOPHILS # BLD AUTO: 0.05 K/UL — SIGNIFICANT CHANGE UP (ref 0–0.2)
BASOPHILS NFR BLD AUTO: 0.6 % — SIGNIFICANT CHANGE UP (ref 0–2)
BUN SERPL-MCNC: 7 MG/DL — SIGNIFICANT CHANGE UP (ref 7–23)
CALCIUM SERPL-MCNC: 9.6 MG/DL — SIGNIFICANT CHANGE UP (ref 8.4–10.5)
CHLORIDE SERPL-SCNC: 99 MMOL/L — SIGNIFICANT CHANGE UP (ref 96–108)
CO2 SERPL-SCNC: 27 MMOL/L — SIGNIFICANT CHANGE UP (ref 22–31)
CREAT SERPL-MCNC: 0.73 MG/DL — SIGNIFICANT CHANGE UP (ref 0.5–1.3)
EGFR: 93 ML/MIN/1.73M2 — SIGNIFICANT CHANGE UP
EOSINOPHIL # BLD AUTO: 0.11 K/UL — SIGNIFICANT CHANGE UP (ref 0–0.5)
EOSINOPHIL NFR BLD AUTO: 1.3 % — SIGNIFICANT CHANGE UP (ref 0–6)
FLUAV AG NPH QL: SIGNIFICANT CHANGE UP
FLUBV AG NPH QL: SIGNIFICANT CHANGE UP
GLUCOSE SERPL-MCNC: 96 MG/DL — SIGNIFICANT CHANGE UP (ref 70–99)
HCT VFR BLD CALC: 40.2 % — SIGNIFICANT CHANGE UP (ref 34.5–45)
HGB BLD-MCNC: 12.4 G/DL — SIGNIFICANT CHANGE UP (ref 11.5–15.5)
IMM GRANULOCYTES NFR BLD AUTO: 0.5 % — SIGNIFICANT CHANGE UP (ref 0–0.9)
LYMPHOCYTES # BLD AUTO: 1.08 K/UL — SIGNIFICANT CHANGE UP (ref 1–3.3)
LYMPHOCYTES # BLD AUTO: 12.4 % — LOW (ref 13–44)
MCHC RBC-ENTMCNC: 25.7 PG — LOW (ref 27–34)
MCHC RBC-ENTMCNC: 30.8 GM/DL — LOW (ref 32–36)
MCV RBC AUTO: 83.4 FL — SIGNIFICANT CHANGE UP (ref 80–100)
MONOCYTES # BLD AUTO: 0.76 K/UL — SIGNIFICANT CHANGE UP (ref 0–0.9)
MONOCYTES NFR BLD AUTO: 8.7 % — SIGNIFICANT CHANGE UP (ref 2–14)
NEUTROPHILS # BLD AUTO: 6.68 K/UL — SIGNIFICANT CHANGE UP (ref 1.8–7.4)
NEUTROPHILS NFR BLD AUTO: 76.5 % — SIGNIFICANT CHANGE UP (ref 43–77)
NRBC # BLD: 0 /100 WBCS — SIGNIFICANT CHANGE UP (ref 0–0)
PLATELET # BLD AUTO: 270 K/UL — SIGNIFICANT CHANGE UP (ref 150–400)
POTASSIUM SERPL-MCNC: 4.3 MMOL/L — SIGNIFICANT CHANGE UP (ref 3.5–5.3)
POTASSIUM SERPL-SCNC: 4.3 MMOL/L — SIGNIFICANT CHANGE UP (ref 3.5–5.3)
RBC # BLD: 4.82 M/UL — SIGNIFICANT CHANGE UP (ref 3.8–5.2)
RBC # FLD: 15.1 % — HIGH (ref 10.3–14.5)
RSV RNA NPH QL NAA+NON-PROBE: SIGNIFICANT CHANGE UP
SARS-COV-2 RNA SPEC QL NAA+PROBE: SIGNIFICANT CHANGE UP
SODIUM SERPL-SCNC: 136 MMOL/L — SIGNIFICANT CHANGE UP (ref 135–145)
WBC # BLD: 8.72 K/UL — SIGNIFICANT CHANGE UP (ref 3.8–10.5)
WBC # FLD AUTO: 8.72 K/UL — SIGNIFICANT CHANGE UP (ref 3.8–10.5)

## 2024-09-21 PROCEDURE — 87637 SARSCOV2&INF A&B&RSV AMP PRB: CPT

## 2024-09-21 PROCEDURE — 94640 AIRWAY INHALATION TREATMENT: CPT

## 2024-09-21 PROCEDURE — 36415 COLL VENOUS BLD VENIPUNCTURE: CPT

## 2024-09-21 PROCEDURE — 99284 EMERGENCY DEPT VISIT MOD MDM: CPT | Mod: 25

## 2024-09-21 PROCEDURE — 80048 BASIC METABOLIC PNL TOTAL CA: CPT

## 2024-09-21 PROCEDURE — 99284 EMERGENCY DEPT VISIT MOD MDM: CPT

## 2024-09-21 PROCEDURE — 96374 THER/PROPH/DIAG INJ IV PUSH: CPT

## 2024-09-21 PROCEDURE — 85025 COMPLETE CBC W/AUTO DIFF WBC: CPT

## 2024-09-21 RX ORDER — IPRATROPIUM BROMIDE AND ALBUTEROL SULFATE .5; 3 MG/3ML; MG/3ML
3 SOLUTION RESPIRATORY (INHALATION)
Refills: 0 | Status: COMPLETED | OUTPATIENT
Start: 2024-09-21 | End: 2024-09-21

## 2024-09-21 RX ORDER — PREDNISONE 10 MG
1 TABLET, DOSE PACK ORAL
Qty: 4 | Refills: 0
Start: 2024-09-21 | End: 2024-09-24

## 2024-09-21 RX ORDER — METHYLPREDNISOLONE 4 MG
125 TABLET ORAL ONCE
Refills: 0 | Status: COMPLETED | OUTPATIENT
Start: 2024-09-21 | End: 2024-09-21

## 2024-09-21 RX ADMIN — Medication 125 MILLIGRAM(S): at 17:14

## 2024-09-21 RX ADMIN — IPRATROPIUM BROMIDE AND ALBUTEROL SULFATE 3 MILLILITER(S): .5; 3 SOLUTION RESPIRATORY (INHALATION) at 18:49

## 2024-09-21 RX ADMIN — IPRATROPIUM BROMIDE AND ALBUTEROL SULFATE 3 MILLILITER(S): .5; 3 SOLUTION RESPIRATORY (INHALATION) at 17:15

## 2024-09-21 RX ADMIN — IPRATROPIUM BROMIDE AND ALBUTEROL SULFATE 3 MILLILITER(S): .5; 3 SOLUTION RESPIRATORY (INHALATION) at 17:14

## 2024-09-21 NOTE — ED ADULT NURSE NOTE - GENITOURINARY ASSESSMENT
Discharge Summary - Nay Blevins 60 y o  female MRN: [id number]    Unit/Bed#: ENDO POOL Encounter: [id number]      Pre-Operative Diagnosis: Pre-Op Diagnosis Codes:     * Constipation, unspecified constipation type [K59 00]    Post-Operative Diagnosis: Post-Op Diagnosis Codes:     * Constipation, unspecified constipation type [K59 00]    Procedures Performed:  Procedure(s):  COLONOSCOPY    Surgeon: Toni Warren MD    See H & P for full details of admission and Operative Note for full details of operations performed  Patient was seen and examined prior to discharge  Provisions for Follow-Up Care:  See After Visit Summary for information related to follow-up care and home orders  Disposition: Home, in stable condition  Planned Readmission: No    Discharge Medications:  See after visit summary for reconciled discharge medications provided to patient and family  Post Operative instructions: Reviewed with patient and/or family  Some portions of this record may have been generated with voice recognition software  There may be translation, syntax,  or grammatical errors  Occasional wrong word or "sound-a-like" substitutions may have occurred due to the inherent limitations of the voice recognition software  Read the chart carefully and recognize, using context, where substitutions may have occurred  If you have any questions, please contact the dictating provider for clarification or correction, as needed  This encounter has been coded by non certified Coder      Signature:   Toni Warren MD  Date: 10/2/2018 Time: 7:45 AM
- - -

## 2024-09-21 NOTE — ED PROVIDER NOTE - NSFOLLOWUPINSTRUCTIONS_ED_ALL_ED_FT
Use nebs every six hours as needed for wheezing.  Take prednisone as prescribed.  Follow up with your PMD. Return to ED with worsening symptoms or other concerns.  Stay well and feel better.    COPD (Chronic Obstructive Pulmonary Disease)    WHAT YOU NEED TO KNOW:    What is COPD? COPD (chronic obstructive pulmonary disease) is a lung disease that causes breathing problems. COPD usually develops from years of irritation and inflammation in your lungs. Obstructive means airflow is blocked. This limits airflow out of your lungs. Smoking, breathing in pollution, genetics, or a history of lung infections can increase your risk for COPD.  COPD    What are the signs and symptoms of COPD?    Shortness of breath    A dry cough    Coughing fits that bring up mucus from your lungs    Wheezing and chest tightness  How is COPD treated? Healthcare providers will work with you to create a care plan. The plan will contain goals defined by you and your providers. It will include steps to help you reach your goals within a specific time frame. The plan may change over time as your goals and needs change. The following may be included in your plan:    Medicines may be used to open your airway or decrease swelling and inflammation in your lungs. Antibiotics may be given for up to 5 days to treat a bacterial infection during an exacerbation. Medicines can relieve certain kinds of symptoms. A short-acting medicine relieves symptoms quickly. This may be called rescue medicine. A long-acting medicine controls or prevents symptoms. This may be called maintenance medicine. Your care plan will have directions for when to take each kind of medicine. Your healthcare provider will give you more information about the medicines you are given and how to use them safely.    Extra oxygen may help you breathe easier and feel more alert if you have severe COPD. Do not increase your oxygen levels without speaking to your doctor first.    Surgery is sometimes done if all other treatments have failed. A lung reduction is surgery to remove part of your damaged lung. A lung transplant is the replacement of your lung with a donor lung.  What can I do to help make breathing easier?    Use pursed-lip breathing any time you feel short of breath. Take a deep breath in through your nose. Slowly breathe out through your mouth with your lips pursed. Try to take 2 times as long to breathe out as to breathe in. This helps you get rid of as much air from your lungs as possible. You can also practice this breathing pattern while you bend, lift, climb stairs, or exercise. It slows down your breathing and helps move more air in and out of your lungs.  Breathe in Breathe out      Avoid anything that makes your symptoms worse. Stay out of high altitudes and places with high humidity. Stay inside, or cover your mouth and nose with a scarf when you are outside in cold weather. Stay inside on days when air pollution or pollen counts are high. Do not use aerosol sprays such as deodorant, bug spray, and hairspray.    Exercise as directed. Your healthcare provider may recommend at least 20 minutes of exercise each day to help increase your energy and decrease shortness of breath. Talk to your provider about the best exercise plan for you.   FAMILY WALKING FOR EXERCISE  How can I manage COPD and help prevent exacerbations? COPD is a serious condition that gets worse over time. A COPD exacerbation means your symptoms suddenly get worse. It is important to prevent exacerbations. An exacerbation can cause more lung damage. COPD cannot be cured, but you can take action to feel better and prevent exacerbations:    Do not smoke. Nicotine and other chemicals in cigarettes and cigars can cause lung damage and make your COPD worse. Ask your healthcare provider for information if you currently smoke and need help to quit. E-cigarettes or smokeless tobacco still contain nicotine. Talk to your healthcare provider before you use these products.    Avoid secondhand smoke. This is smoke another person exhales. Even if you have never smoked or have quit, it is important to avoid secondhand smoke. This smoke can also cause lung damage or trigger an exacerbation.    Go to pulmonary rehabilitation (rehab) if directed. Rehab is a program run by specialists who help you learn to manage COPD. Examples include a pulmonologist (lung specialist), dietitian, or exercise therapist. The specialists will help you make a plan to avoid triggers that cause an exacerbation.    Take your medicines as directed. Refill your medicines before you are out so that you do not miss a dose. Ask your healthcare provider if you have any questions on how to take your medicines.    Protect yourself from germs. Germs can get into your lungs and cause an infection. An infection in your lungs can create more mucus and make it harder to breathe. An infection can also create swelling in your airway and prevent air from getting in. You can decrease your risk for infection by doing the following:    Wash your hands often with soap and water. Carry germ-killing gel with you. You can use the gel to clean your hands when soap and water are not available.  Handwashing      Do not touch your eyes, nose, or mouth unless you have washed your hands first.    Always cover your mouth when you cough. Cough into a tissue or your shirtsleeve so you do not spread germs from your hands.    Try to avoid people who have a cold or the flu. If you are sick, stay away from others as much as possible.    Ask about vaccines you may need. Influenza (the flu), pneumonia, and COVID-19 can become life-threatening for a person who has COPD. Get a yearly flu vaccine as soon as recommended, usually in September or October. The pneumonia vaccine may be given every 5 years, or as directed. COVID-19 vaccines are available in shots given in 1 or 2 doses. Your healthcare provider can tell you if you should also get other vaccines, and when to get them.    Drink liquids as directed. You may need to drink more liquid than usual. Liquid will help to keep your air passages moist and help you cough up mucus. Ask how much liquid to drink each day and which liquids are best for you.  Call your local emergency number (911 in the [place]) if:    You feel lightheaded, short of breath, and have chest pain.    When should I seek immediate care?    You cough up blood.    You are confused, dizzy, or feel faint.    Your arm or leg feels warm, tender, and painful. It may look swollen and red.  When should I call my doctor?    You have increased shortness of breath.    You need more medicine than usual to control your symptoms.    You are coughing or wheezing more than usual.    You are coughing up more mucus, or it has a new color or odor.    You gain more than 3 pounds in a week.    You have a fever, a runny or stuffy nose, and a sore throat, or other cold or flu symptoms.    Your skin, lips, or nails start to turn blue.    You have swelling in your legs or ankles.    You are very tired or weak for more than a day.    You notice changes in your mood, or changes in your ability to think or concentrate.    You have questions or concerns about your condition or care.  CARE AGREEMENT:    You have the right to help plan your care. Learn about your health condition and how it may be treated. Discuss treatment options with your healthcare providers to decide what care you want to receive. You always have the right to refuse treatment.    © [employer] 1973, 2024

## 2024-09-21 NOTE — ED ADULT NURSE NOTE - NS PRO PASSIVE SMOKE EXP
Yes...

## 2024-09-21 NOTE — ED ADULT NURSE NOTE - NSFALLHARMRISKINTERV_ED_ALL_ED

## 2024-09-21 NOTE — ED ADULT TRIAGE NOTE - CHIEF COMPLAINT QUOTE
"I have been having a cough and I have COPD and asthma but the nebulizer is not helping and my chest is so tight". Patient does not use oxygen at home but noted to have wheezing.

## 2024-09-21 NOTE — ED ADULT NURSE NOTE - NURSING MUSC JOINTS
Appt 9/30/19 for multiple issues
LM for patient to call back and Reschedule Cancelled Appt from 06/2019, and patient is overdue for labs that should have been drawn for 06/2019 appt.   Stated the labs need to be done asap and appt needs to be scheduled in the system to Release Refills, and
no pain, swelling or deformity of joints

## 2024-09-21 NOTE — ED PROVIDER NOTE - PATIENT PORTAL LINK FT
You can access the FollowMyHealth Patient Portal offered by [facility] by registering at the following website: [email or web address]. By joining [employer]’s FollowMyHealth portal, you will also be able to view your health information using other applications (apps) compatible with our system.

## 2024-09-21 NOTE — ED ADULT NURSE NOTE - NSFALLRISKASMT_ED_ALL_ED_DT
Physical Therapy Daily Treatment     Visit Count: 7  Plan of Care Dates: Initial: 10/24/2017 Through: 12/5/2017    Insurance Information: physical and speech therapy combined cap of $1980/$3700 per calendar year, $1053.73 used at the time of evaluation  Next Referring Provider Visit: 12/13/17    Referred by: Adair Boateng MD  Medical Diagnosis (from order):    781.2 (ICD-9-CM) - R26.9 (ICD-10-CM) - Abnormality of gait and mobility   780.99 (ICD-9-CM) - R68.89 (ICD-10-CM) - Activity intolerance   V43.65 (ICD-9-CM) - Z96.651 (ICD-10-CM) - Status post total knee replacement, right       Treatment Diagnosis: Knee Symptoms with Pain, Impaired Joint Mobility, Impaired Range of Motion, Impaired Motor Function/Muscle Performance, Impaired Gait/Locomotion Deficits, Impaired Mobility and Impaired Balance  Insurance: 1. MEDICARE  2. [facility]    Date of Surgery: 10/17/17; Surgery performed: Right TKR; Physician Guidelines: yes    Diagnosis Precautions: Blood thinner, RICHELLE hose  Chart reviewed: Relevant co-morbidities, allergies, tests and medications: Pacemaker, history of right THR, history of right ankle fracture    SUBJECTIVE   Patient reports doing well.  Had a sharp pain the woke him in the middle of the night.  Compliant with HEP.    Current Pain: 3-4/10.    Functional Change:   Feels strong, not off balance at all.      OBJECTIVE   ° *  AROM  Gait with cane with mild antalgia, mild Trendelenberg, lacking terminal knee extension.    Steri strips, a few off, incision healing very well.      Treatment     Therapeutic Exercises: Verbal, visual, and/or manual cues were provided for positioning, sets/repetitions/hold times.   -- Written HEP provided.  Access code [email or web address]    HEP Exercise Sets/Reps Comments/Cues    x Previous HEP: Glute set, QS, SLR, heel slide, ankle pump, SAQ, seated knee flexion, supine hip ABD reviewed Added gait belt to assist lifting leg, cued breathing and positioning   x QS + SLR   Quad set activation   x Knee flexion stretch on stair      Heel slides on wall  reviewed With AAROM   x Long seated HS/ gastroc stretch with ankle pumps     x SAQ     x Mini squats  alignment   x TKE stretch - seated with heel on floor 5min Heel on stool not tolerated  Low load, long duration                                  Recumbent bike 5min Full circles   Seat 11    ROM       Manual Therapy:    Patellar mobes in all planes in extension.    PA femoral joint mobes to promote terminal knee extension.      Patient Education:  Patient was instructed in therapeutic exercise, home exercise program, gait.      Plan for next session: ROM, strengthening as tolerated.  Work on terminal knee extension.   *NO ES*    ASSESSMENT   Patient tolerated treatment well today.   Continues to lack terminal knee extension, focused on this today to reduce gait deviation.      Pain after treatment: 2/10  Result of above outlined education: Verbalizes understanding    Goals:       To be obtained by end of this plan of care:  1. Patient independent with modified and progressed home exercise program.  2. Patient will decrease involved knee pain/symptoms to 3/10 at worst  to aid in sleeping undisturbed through a night.   3. Patient will increase involved knee active range of motion to 0-120° to aid in normalized gait for functional ambulation.   4. Patient will increase involved knee strength to 4+/5 to aid in stair ambulation and tolerating typical daily household duties.  5. Lower Extremity Functional Scale: Patient will complete form to reflect an improved score from initial score of 14 to greater than or equal to 50 (0=extreme difficulty; 80=no difficulty) to indicate pt reported improvement in function/disability/impairment (minimal detectable change: 9 points).      PLAN   Frequency/Duration: 2 times per week for 6 weeks with tapering as the patient progresses for a total of 10 visits  Skilled training and instruction for the following  interventions:  Activities of Daily Living/Self Care 04019  Manual Therapy 84531  Neuromuscular Re-education 92070  Therapeutic activity 05731  Therapeutic exercise 27815  Heat/Cold  Ultrasound/Phonophoresis 21562   Gait Training 73540       THERAPY DAILY BILLING   Primary Insurance:  MEDICARE  Secondary Insurance: [facility]    Evaluation Procedures:  No evaluation codes were used on this date of service    Timed Procedures:   KX modifier applied  Manual Therapy, 20 minutes  Therapeutic Exercise, 10 minutes    Untimed Procedures:  KX modifier applied    Total Treatment Time: 30 minutes        G-Code:  G-Code Score ABN form  reporting not required this treatment session  Modifier based on outcome measure(s)/functional testing/clinical judgement as listed above    The referring provider's electronic or written signature on the evaluation authorizes the therapy plan of care and certifies the need for these services, furnished under this plan of care while under their care.  Physician Signature on file.      21-Sep-2024 17:39

## 2024-09-21 NOTE — ED PROVIDER NOTE - CLINICAL SUMMARY MEDICAL DECISION MAKING FREE TEXT BOX
63 y/o f with pmh of copd, current smoker presents to ED with wheezing and shortness of breath. Complains of some mild congestion - no hx of intubations.  Pt has been using inhaler and nebulizer at home.  No fever or chills.  No leg swelling.      Pt with mild resp distress secondary to wheeze  RVP neg, labs wnl  Received nebs and steroids in ED with good relief of symptoms  Will dc with nebs, steroids  Understands dc and follow up instructions

## 2024-09-21 NOTE — ED PROVIDER NOTE - PHYSICAL EXAMINATION
VITAL SIGNS: I have reviewed nursing notes and confirm.  CONSTITUTIONAL: Well-developed; well-nourished; in no acute distress.  SKIN: Agree with RN documentation regarding decubitus evaluation. Remainder of skin exam is warm and dry, no acute rash.  HEAD: Normocephalic; atraumatic.  EYES: PERRL, EOM intact; conjunctiva and sclera clear.  ENT: No nasal discharge; airway clear.  NECK: Supple; non tender.  CARD: S1, S2 normal; no murmurs, gallops, or rubs. Regular rate and rhythm.  RESP: wheeze bilaterally  ABD: Normal bowel sounds; soft; non-distended; non-tender; no hepatosplenomegaly.  EXT: Normal ROM. No clubbing, cyanosis or edema.  LYMPH: No acute cervical adenopathy.  NEURO: Alert, oriented. Grossly unremarkable.  PSYCH: Cooperative, appropriate.

## 2024-09-21 NOTE — ED PROVIDER NOTE - OBJECTIVE STATEMENT
63 y/o f with pmh of copd, current smoker presents to ED with wheezing and shortness of breath. Complains of some mild congestion - no hx of intubations.  Pt has been using inhaler and nebulizer at home.  No fever or chills.  No leg swelling.

## 2024-09-21 NOTE — ED ADULT NURSE NOTE - OBJECTIVE STATEMENT
SOB x 4 days green brown clear phlemg pain w/coughing rt left rib area and mid chest, having difficulty speaking full sentences never intubated smoker lung sounds diminished bilateral crakles LLL wheezing RUL PRACHI SOB x 4 days green brown clear phlemg pain w/coughing rt left rib area and mid chest, having difficulty speaking full sentences never intubated smoker lung sounds diminished bilateral crakles LLL wheezing RUL PRACHI, pt pending re eval

## 2024-09-30 ENCOUNTER — NON-APPOINTMENT (OUTPATIENT)
Age: 64
End: 2024-09-30

## 2024-10-01 ENCOUNTER — APPOINTMENT (OUTPATIENT)
Dept: PULMONOLOGY | Facility: CLINIC | Age: 64
End: 2024-10-01
Payer: MEDICAID

## 2024-10-01 VITALS
SYSTOLIC BLOOD PRESSURE: 140 MMHG | OXYGEN SATURATION: 95 % | DIASTOLIC BLOOD PRESSURE: 80 MMHG | WEIGHT: 160 LBS | BODY MASS INDEX: 32.32 KG/M2 | TEMPERATURE: 96.4 F | HEART RATE: 108 BPM

## 2024-10-01 DIAGNOSIS — Z23 ENCOUNTER FOR IMMUNIZATION: ICD-10-CM

## 2024-10-01 DIAGNOSIS — J44.9 CHRONIC OBSTRUCTIVE PULMONARY DISEASE, UNSPECIFIED: ICD-10-CM

## 2024-10-01 PROCEDURE — 90656 IIV3 VACC NO PRSV 0.5 ML IM: CPT

## 2024-10-01 PROCEDURE — 99213 OFFICE O/P EST LOW 20 MIN: CPT | Mod: 25

## 2024-10-01 PROCEDURE — 94010 BREATHING CAPACITY TEST: CPT

## 2024-10-01 PROCEDURE — 94727 GAS DIL/WSHOT DETER LNG VOL: CPT

## 2024-10-01 PROCEDURE — G0008: CPT

## 2024-10-01 PROCEDURE — 94729 DIFFUSING CAPACITY: CPT

## 2024-10-01 RX ORDER — UMECLIDINIUM BROMIDE AND VILANTEROL TRIFENATATE 62.5; 25 UG/1; UG/1
62.5-25 POWDER RESPIRATORY (INHALATION)
Qty: 1 | Refills: 5 | Status: ACTIVE | COMMUNITY
Start: 2024-10-01 | End: 1900-01-01

## 2024-10-01 NOTE — CONSULT LETTER
[Dear  ___] : Dear  [unfilled], [Courtesy Letter:] : I had the pleasure of seeing your patient, [unfilled], in my office today. [Please see my note below.] : Please see my note below. [Consult Closing:] : Thank you very much for allowing me to participate in the care of this patient.  If you have any questions, please do not hesitate to contact me. [Sincerely,] : Sincerely, [FreeTextEntry2] : Tashi Mcdermott MD\par  [address] \[place] [address] [FreeTextEntry3] : Eloise Ramirez MD, FCCP\par

## 2024-10-01 NOTE — ASSESSMENT
[FreeTextEntry1] : Data reviewed:  LDCT [facility] 7/2024 personally reviewed : no suspicious nodules, lingular atx  [place] 4/2022: mod obstruction, FEV1 58% [place] 7/23/2024: mod-sev restriction, FEV1 50% PFT 10/1/2024: mod obstruction, FEV1 66%, couldn't cont w BD, TLC 85%, DLCO 56%  Impression: COPD exacerbation due to parainfluenza July 2024. again Sept 2024 Tobacco dependence in LDCT  Plan: Add Anoro daily. Had her spend time w RT for inhaler teaching for albuterol. May need teaching for Anoro as well, but will see what LABA/LAMA is covered. Will see her back in 3 mos. LDCT 7/2025. Flu shot given.

## 2024-10-01 NOTE — HISTORY OF PRESENT ILLNESS
[Current] : current [TextBox_4] : 05/17/2023: Follow up visit for this patient seen by Dr Santo in 2017 and again 2022. She has obstructed spirometry. She doesn't know why she's here. She has some cough. She has some dyspnea on exertion, nothing that limits her. No pulmonary exacerbations. She is smoking 3-4 cigs a day. She is having shoulder surgery on 5/26. I discovered this by asking her why she had a recent CXR at [facility]. Her only previous surgeries are C sections and a hernia repair with no pulmonary complications. No VTE hx.  7/23/2024: Comes in now after being admitted at [facility] for COPD exacerbation due to parainfluenza from July 11-15, 2024. Continuing to smoke about 3 cigs a day. Hospital notes reflect her being on Symbicort, and she doesn't know, but she wasn't on any standing inhaler before hospitalization nor is she now. She is using albuterol prn, but not since leaving the hospital. Also has not used nebs since leaving hospital. Did finish her steroids and azithro at home and feels good now.  10/1/2024: Returns for follow up. Was in our ED 2 weeks ago for an exacerbation, given nebs and prednisone. Continues to smoke but less, 1 today and 2 yesterday.

## 2024-10-03 NOTE — ED PROVIDER NOTE - NSTIMEPROVIDERCAREINITIATE_GEN_ER
What Type Of Note Output Would You Prefer (Optional)?: Standard Output How Severe Are Your Spot(S)?: mild Have Your Spot(S) Been Treated In The Past?: has not been treated Hpi Title: Evaluation of Skin Lesions Additional History: Patient presents for total body skin evaluation. Patient Declines Chaperone. 15-Nov-2022 08:27

## 2024-12-18 ENCOUNTER — EMERGENCY (EMERGENCY)
Facility: HOSPITAL | Age: 64
LOS: 1 days | Discharge: HOME | End: 2024-12-18
Attending: STUDENT IN AN ORGANIZED HEALTH CARE EDUCATION/TRAINING PROGRAM | Admitting: STUDENT IN AN ORGANIZED HEALTH CARE EDUCATION/TRAINING PROGRAM
Payer: MEDICAID

## 2024-12-18 VITALS
SYSTOLIC BLOOD PRESSURE: 137 MMHG | HEIGHT: 59 IN | OXYGEN SATURATION: 96 % | RESPIRATION RATE: 18 BRPM | HEART RATE: 122 BPM | TEMPERATURE: 98 F | DIASTOLIC BLOOD PRESSURE: 80 MMHG | WEIGHT: 160.06 LBS

## 2024-12-18 VITALS
DIASTOLIC BLOOD PRESSURE: 78 MMHG | SYSTOLIC BLOOD PRESSURE: 124 MMHG | RESPIRATION RATE: 19 BRPM | OXYGEN SATURATION: 97 % | HEART RATE: 104 BPM

## 2024-12-18 DIAGNOSIS — Z87.39 PERSONAL HISTORY OF OTHER DISEASES OF THE MUSCULOSKELETAL SYSTEM AND CONNECTIVE TISSUE: ICD-10-CM

## 2024-12-18 DIAGNOSIS — M54.31 SCIATICA, RIGHT SIDE: ICD-10-CM

## 2024-12-18 DIAGNOSIS — E11.9 TYPE 2 DIABETES MELLITUS WITHOUT COMPLICATIONS: ICD-10-CM

## 2024-12-18 DIAGNOSIS — E78.5 HYPERLIPIDEMIA, UNSPECIFIED: ICD-10-CM

## 2024-12-18 DIAGNOSIS — I10 ESSENTIAL (PRIMARY) HYPERTENSION: ICD-10-CM

## 2024-12-18 LAB
APPEARANCE UR: CLEAR — SIGNIFICANT CHANGE UP
BILIRUB UR-MCNC: NEGATIVE — SIGNIFICANT CHANGE UP
COLOR SPEC: YELLOW — SIGNIFICANT CHANGE UP
DIFF PNL FLD: NEGATIVE — SIGNIFICANT CHANGE UP
GLUCOSE UR QL: NEGATIVE MG/DL — SIGNIFICANT CHANGE UP
KETONES UR-MCNC: ABNORMAL MG/DL
LEUKOCYTE ESTERASE UR-ACNC: ABNORMAL
NITRITE UR-MCNC: NEGATIVE — SIGNIFICANT CHANGE UP
PH UR: 7 — SIGNIFICANT CHANGE UP (ref 5–8)
PROT UR-MCNC: NEGATIVE MG/DL — SIGNIFICANT CHANGE UP
SP GR SPEC: 1.02 — SIGNIFICANT CHANGE UP (ref 1–1.03)
UROBILINOGEN FLD QL: 1 MG/DL — SIGNIFICANT CHANGE UP (ref 0.2–1)

## 2024-12-18 PROCEDURE — 99284 EMERGENCY DEPT VISIT MOD MDM: CPT

## 2024-12-18 PROCEDURE — 96372 THER/PROPH/DIAG INJ SC/IM: CPT

## 2024-12-18 PROCEDURE — 99283 EMERGENCY DEPT VISIT LOW MDM: CPT | Mod: 25

## 2024-12-18 PROCEDURE — 87086 URINE CULTURE/COLONY COUNT: CPT

## 2024-12-18 PROCEDURE — 81001 URINALYSIS AUTO W/SCOPE: CPT

## 2024-12-18 RX ORDER — CYCLOBENZAPRINE HYDROCHLORIDE 15 MG/1
10 CAPSULE, EXTENDED RELEASE ORAL ONCE
Refills: 0 | Status: COMPLETED | OUTPATIENT
Start: 2024-12-18 | End: 2024-12-18

## 2024-12-18 RX ORDER — GABAPENTIN 400 MG/1
1 CAPSULE ORAL
Qty: 28 | Refills: 0
Start: 2024-12-18 | End: 2024-12-31

## 2024-12-18 RX ORDER — GABAPENTIN 400 MG/1
300 CAPSULE ORAL ONCE
Refills: 0 | Status: COMPLETED | OUTPATIENT
Start: 2024-12-18 | End: 2024-12-18

## 2024-12-18 RX ORDER — LIDOCAINE HYDROCHLORIDE 20 MG/ML
1 JELLY TOPICAL ONCE
Refills: 0 | Status: COMPLETED | OUTPATIENT
Start: 2024-12-18 | End: 2024-12-18

## 2024-12-18 RX ORDER — IBUPROFEN 200 MG
1 TABLET ORAL
Qty: 42 | Refills: 0
Start: 2024-12-18 | End: 2024-12-31

## 2024-12-18 RX ORDER — ACETAMINOPHEN 500 MG/5ML
650 LIQUID (ML) ORAL ONCE
Refills: 0 | Status: COMPLETED | OUTPATIENT
Start: 2024-12-18 | End: 2024-12-18

## 2024-12-18 RX ORDER — KETOROLAC TROMETHAMINE 30 MG/ML
30 INJECTION, SOLUTION INTRAMUSCULAR; INTRAVENOUS ONCE
Refills: 0 | Status: DISCONTINUED | OUTPATIENT
Start: 2024-12-18 | End: 2024-12-18

## 2024-12-18 RX ADMIN — CYCLOBENZAPRINE HYDROCHLORIDE 10 MILLIGRAM(S): 15 CAPSULE, EXTENDED RELEASE ORAL at 20:33

## 2024-12-18 RX ADMIN — LIDOCAINE HYDROCHLORIDE 1 PATCH: 20 JELLY TOPICAL at 20:33

## 2024-12-18 RX ADMIN — Medication 650 MILLIGRAM(S): at 20:33

## 2024-12-18 RX ADMIN — GABAPENTIN 300 MILLIGRAM(S): 400 CAPSULE ORAL at 20:33

## 2024-12-18 RX ADMIN — KETOROLAC TROMETHAMINE 30 MILLIGRAM(S): 30 INJECTION, SOLUTION INTRAMUSCULAR; INTRAVENOUS at 20:33

## 2025-01-13 ENCOUNTER — APPOINTMENT (OUTPATIENT)
Dept: PULMONOLOGY | Facility: CLINIC | Age: 65
End: 2025-01-13

## 2025-01-13 VITALS
DIASTOLIC BLOOD PRESSURE: 86 MMHG | OXYGEN SATURATION: 96 % | TEMPERATURE: 97.3 F | SYSTOLIC BLOOD PRESSURE: 124 MMHG | HEIGHT: 59 IN | BODY MASS INDEX: 32.25 KG/M2 | WEIGHT: 160 LBS | HEART RATE: 111 BPM

## 2025-01-13 DIAGNOSIS — F17.200 NICOTINE DEPENDENCE, UNSPECIFIED, UNCOMPLICATED: ICD-10-CM

## 2025-01-13 DIAGNOSIS — J44.9 CHRONIC OBSTRUCTIVE PULMONARY DISEASE, UNSPECIFIED: ICD-10-CM

## 2025-01-13 DIAGNOSIS — Z23 ENCOUNTER FOR IMMUNIZATION: ICD-10-CM

## 2025-01-13 PROCEDURE — G0296 VISIT TO DETERM LDCT ELIG: CPT

## 2025-01-13 PROCEDURE — G0009: CPT

## 2025-01-13 PROCEDURE — 90677 PCV20 VACCINE IM: CPT

## 2025-01-13 PROCEDURE — 99213 OFFICE O/P EST LOW 20 MIN: CPT | Mod: 25

## 2025-01-13 RX ORDER — ALBUTEROL SULFATE 90 UG/1
108 (90 BASE) INHALANT RESPIRATORY (INHALATION)
Qty: 1 | Refills: 5 | Status: ACTIVE | COMMUNITY
Start: 2025-01-13 | End: 1900-01-01

## 2025-01-29 NOTE — ED PROVIDER NOTE - WR ORDER ID 1
CELY spoke with pt's brother Bozena [phone] to discuss dc planning. Per family they are okay with the pt going to SNF at [facility]. CELY spoke with admissions are [facility]. They will reach out to the family to complete paperwork. Per MD pt should be medically ready for dc around Friday. Cm will continue to follow pt through transitions of care and assist with any discharge needs.    SNOW Porras  [phone]     01/29/25 1307   Post-Acute Status   Post-Acute Authorization Placement   Post-Acute Placement Status Referrals Sent   Coverage PHN   Discharge Delays None known at this time   Discharge Plan   Discharge Plan A Skilled Nursing Facility        [id number]

## 2025-02-17 ENCOUNTER — EMERGENCY (EMERGENCY)
Facility: HOSPITAL | Age: 65
LOS: 1 days | Discharge: HOME | End: 2025-02-17
Attending: EMERGENCY MEDICINE | Admitting: EMERGENCY MEDICINE
Payer: COMMERCIAL

## 2025-02-17 VITALS
SYSTOLIC BLOOD PRESSURE: 150 MMHG | RESPIRATION RATE: 18 BRPM | HEIGHT: 59 IN | OXYGEN SATURATION: 95 % | WEIGHT: 160.06 LBS | TEMPERATURE: 98 F | DIASTOLIC BLOOD PRESSURE: 86 MMHG | HEART RATE: 115 BPM

## 2025-02-17 PROCEDURE — 99284 EMERGENCY DEPT VISIT MOD MDM: CPT | Mod: 25

## 2025-02-17 PROCEDURE — 82962 GLUCOSE BLOOD TEST: CPT

## 2025-02-17 PROCEDURE — 70450 CT HEAD/BRAIN W/O DYE: CPT | Mod: 26

## 2025-02-17 PROCEDURE — 70450 CT HEAD/BRAIN W/O DYE: CPT | Mod: MC

## 2025-02-17 PROCEDURE — 72125 CT NECK SPINE W/O DYE: CPT | Mod: 26

## 2025-02-17 PROCEDURE — 99285 EMERGENCY DEPT VISIT HI MDM: CPT

## 2025-02-17 PROCEDURE — 73080 X-RAY EXAM OF ELBOW: CPT | Mod: 26,LT

## 2025-02-17 PROCEDURE — 72125 CT NECK SPINE W/O DYE: CPT | Mod: MC

## 2025-02-17 PROCEDURE — 73080 X-RAY EXAM OF ELBOW: CPT

## 2025-02-17 RX ORDER — ACETAMINOPHEN 160 MG/5ML
650 SUSPENSION ORAL ONCE
Refills: 0 | Status: COMPLETED | OUTPATIENT
Start: 2025-02-17 | End: 2025-02-17

## 2025-02-17 RX ADMIN — ACETAMINOPHEN 650 MILLIGRAM(S): 160 SUSPENSION ORAL at 13:37

## 2025-02-17 NOTE — ED PROVIDER NOTE - CLINICAL SUMMARY MEDICAL DECISION MAKING FREE TEXT BOX
Patient is a 64-year-old female, who presents to ED status post syncopal event where she hit her left elbow and hit her head  loosening one of her teeth.  Patient was seen and examined and had an x-ray of her left elbow which was unremarkable for fracture, and a CT scan of the head which was unremarkable for intracranial hemorrhage.  Patient will follow-up with her primary care provider tomorrow for further evaluation and management.

## 2025-02-17 NOTE — ED PROVIDER NOTE - PATIENT PORTAL LINK FT
You can access the FollowMyHealth Patient Portal offered by [facility] by registering at the following website: [email or web address]. By joining [facility]’s FollowMyHealth portal, you will also be able to view your health information using other applications (apps) compatible with our system.

## 2025-02-17 NOTE — ED PROVIDER NOTE - NS ED ATTENDING STATEMENT MOD
I have seen and examined this patient and fully participated in the care of this patient as the teaching attending.  The service was shared with the BETTYE.  I reviewed and verified the documentation.

## 2025-02-17 NOTE — ED PROVIDER NOTE - NSICDXPASTMEDICALHX_GEN_ALL_CORE_FT
PAST MEDICAL HISTORY:  Asthma     COPD, moderate     Current smoker     DM (diabetes mellitus)     HLD (hyperlipidemia)     HTN (hypertension)     IHSS (idiopathic hypertrophic subaortic stenosis)     Lumbar disc herniation

## 2025-02-17 NOTE — ED PROVIDER NOTE - ATTENDING CONTRIBUTION TO CARE
63 yo with near syncope 2 wks ago/ feels well now w exception of Rt elbow pain / + had hit mouth during fall / no LOC / no HA , no vomiting, + loose tooth at time / f/u w dentist, CT neg now /   xray neg for fracture, neurovasc intact / ACE / f/u outpt

## 2025-02-17 NOTE — ED PROVIDER NOTE - NSFOLLOWUPINSTRUCTIONS_ED_ALL_ED_FT
Syncope, Adult  Outline of the head showing blood vessels that supply the brain.  Syncope refers to a condition in which a person temporarily loses consciousness. Syncope may also be called fainting or passing out. It is caused by a sudden decrease in blood flow to the brain. This can happen for a variety of reasons.    Most causes of syncope are not dangerous. It can be triggered by things such as needle sticks, seeing blood, pain, or intense emotion. However, syncope can also be a sign of a serious medical problem, such as a heart abnormality. Other causes can include dehydration, migraines, or taking medicines that lower blood pressure. Your health care provider may do tests to find the reason why you are having syncope.    If you faint, get medical help right away. Call your local emergency services (911 in the [place]    Follow these instructions at home:  Pay attention to any changes in your symptoms. Take these actions to stay safe and to help relieve your symptoms:    Knowing when you may be about to faint    Signs that you may be about to faint include:  Feeling dizzy, weak, light-headed, or like the room is spinning.  Feeling nauseous.  Seeing spots or seeing all white or all black in your field of vision.  Having cold, clammy skin or feeling warm and sweaty.  Hearing ringing in the ears (tinnitus).  If you start to feel like you might faint, sit or lie down right away. If sitting, put your head down between your legs. If lying down, raise (elevate) your feet above the level of your heart.  Breathe deeply and steadily. Wait until all the symptoms have passed.  Have someone stay with you until you feel stable.  Medicines    Take over-the-counter and prescription medicines only as told by your health care provider.  If you are taking blood pressure or heart medicine, get up slowly and take several minutes to sit and then stand. This can reduce dizziness and decrease the risk of syncope.  Lifestyle    Do not drive, use machinery, or play sports until your health care provider says it is okay.  Do not drink alcohol.  Do not use any products that contain nicotine or tobacco. These products include cigarettes, chewing tobacco, and vaping devices, such as e-cigarettes. If you need help quitting, ask your health care provider.  Avoid hot tubs and saunas.  General instructions    Talk with your health care provider about your symptoms. You may need to have testing to understand the cause of your syncope.  Drink enough fluid to keep your urine pale yellow.  Avoid prolonged standing. If you must stand for a long time, do movements such as:  Moving your legs.  Crossing your legs.  Flexing and stretching your leg muscles.  Squatting.  Keep all follow-up visits. This is important.  Contact a health care provider if:  You have episodes of near fainting.  Get help right away if:  You faint.  You hit your head or are injured after fainting.  You have any of these symptoms that may indicate trouble with your heart:  Fast or irregular heartbeats (palpitations).  Unusual pain in your chest, abdomen, or back.  Shortness of breath.  You have a seizure.  You have a severe headache.  You are confused.  You have vision problems.  You have severe weakness or trouble walking.  You are bleeding from your mouth or rectum, or you have black or tarry stool.  These symptoms may represent a serious problem that is an emergency. Do not wait to see if your symptoms will go away. Get medical help right away. Call your local emergency services (911 in the [place] Do not drive yourself to the hospital.    Summary  Syncope refers to a condition in which a person temporarily loses consciousness. Syncope may also be called fainting or passing out. It is caused by a sudden decrease in blood flow to the brain.  Signs that you may be about to faint include dizziness, feeling light-headed, feeling nauseous, sudden vision changes, or cold, clammy skin.  Even though most causes of syncope are not dangerous, syncope can be a sign of a serious medical problem. Get help right away if you faint.  If you start to feel like you might faint, sit or lie down right away. If sitting, put your head down between your legs. If lying down, raise (elevate) your feet above the level of your heart.  This information is not intended to replace advice given to you by your health care provider. Make sure you discuss any questions you have with your health care provider.    Document Revised: 04/28/2022 Document Reviewed: 04/28/2022  [employer] Patient Education © 2024 [employer]    Contusion  A contusion is a deep bruise. This is a result of an injury that causes bleeding under the skin. Symptoms of bruising include pain, swelling, and discolored skin. The skin may turn blue, purple, or yellow.    Follow these instructions at home:  Managing pain, stiffness, and swelling    Bag of ice on a towel on the skin.  You may use RICE. This stands for:  Resting.  Icing.  Compression, or putting pressure on the injured area.  Elevating, or raising the injured area.  To follow this method, do these actions:  Rest the injured area.  If told, put ice on the injured area. To do this:  Put ice in a plastic bag.  Place a towel between your skin and the bag.  Leave the ice on for 20 minutes, 2–3 times per day.  If your skin turns bright red, take off the ice right away to prevent skin damage. The risk of skin damage is higher if you cannot feel pain, heat, or cold.  If told, apply compression on the injured area using an elastic bandage. Make sure the bandage is not too tight. If the area tingles or has a loss of feeling (numbness), remove it and put it back on as told by your doctor.  If possible, elevate the injured area above the level of your heart while you are sitting or lying down.  General instructions    Take over-the-counter and prescription medicines only as told by your doctor.  Keep all follow-up visits. Your doctor may want to see how your contusion is healing with treatment.  Contact a doctor if:  Your symptoms do not get better after several days of treatment.  Your symptoms get worse.  You have trouble moving the injured area.  Get help right away if:  You have very bad pain.  You have a loss of feeling (numbness) in a hand or foot.  Your hand or foot turns pale or cold.  This information is not intended to replace advice given to you by your health care provider. Make sure you discuss any questions you have with your health care provider.    Document Revised: 06/05/2023 Document Reviewed: 06/05/2023  [employer] Patient Education © 2024 [employer]

## 2025-02-17 NOTE — ED PROVIDER NOTE - OBJECTIVE STATEMENT
Patient is a 64-year-old female, presents to ED status post fall x 2 weeks.  Patient states that she had a near syncopal event, and fell hitting her mouth on the table loosening one of her teeth.  Patient denies shortness of breath, chest pain, nausea, vomiting prior to and after the incident.  Patient having pain in her left elbow.  Patient denies any other injuries.

## 2025-02-19 DIAGNOSIS — S00.93XA CONTUSION OF UNSPECIFIED PART OF HEAD, INITIAL ENCOUNTER: ICD-10-CM
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
## 2025-02-19 DIAGNOSIS — S50.02XA CONTUSION OF LEFT ELBOW, INITIAL ENCOUNTER: ICD-10-CM

## 2025-04-14 ENCOUNTER — EMERGENCY (EMERGENCY)
Facility: HOSPITAL | Age: 65
LOS: 1 days | End: 2025-04-14
Attending: EMERGENCY MEDICINE | Admitting: EMERGENCY MEDICINE
Payer: MEDICAID

## 2025-04-14 VITALS
SYSTOLIC BLOOD PRESSURE: 118 MMHG | TEMPERATURE: 98 F | HEART RATE: 119 BPM | WEIGHT: 160.06 LBS | DIASTOLIC BLOOD PRESSURE: 81 MMHG | RESPIRATION RATE: 16 BRPM | OXYGEN SATURATION: 96 % | HEIGHT: 59 IN

## 2025-04-14 VITALS
HEART RATE: 88 BPM | OXYGEN SATURATION: 94 % | TEMPERATURE: 98 F | SYSTOLIC BLOOD PRESSURE: 138 MMHG | RESPIRATION RATE: 16 BRPM | DIASTOLIC BLOOD PRESSURE: 88 MMHG

## 2025-04-14 LAB
ANION GAP SERPL CALC-SCNC: 12 MMOL/L — SIGNIFICANT CHANGE UP (ref 5–17)
APPEARANCE UR: CLEAR — SIGNIFICANT CHANGE UP
BASOPHILS # BLD AUTO: 0.04 K/UL — SIGNIFICANT CHANGE UP (ref 0–0.2)
BASOPHILS NFR BLD AUTO: 0.5 % — SIGNIFICANT CHANGE UP (ref 0–2)
BILIRUB UR-MCNC: NEGATIVE — SIGNIFICANT CHANGE UP
BUN SERPL-MCNC: 12 MG/DL — SIGNIFICANT CHANGE UP (ref 7–23)
CALCIUM SERPL-MCNC: 9.8 MG/DL — SIGNIFICANT CHANGE UP (ref 8.4–10.5)
CHLORIDE SERPL-SCNC: 100 MMOL/L — SIGNIFICANT CHANGE UP (ref 96–108)
CO2 SERPL-SCNC: 27 MMOL/L — SIGNIFICANT CHANGE UP (ref 22–31)
COLOR SPEC: YELLOW — SIGNIFICANT CHANGE UP
CREAT SERPL-MCNC: 0.82 MG/DL — SIGNIFICANT CHANGE UP (ref 0.5–1.3)
DIFF PNL FLD: NEGATIVE — SIGNIFICANT CHANGE UP
EGFR: 81 ML/MIN/1.73M2 — SIGNIFICANT CHANGE UP
EGFR: 81 ML/MIN/1.73M2 — SIGNIFICANT CHANGE UP
EOSINOPHIL # BLD AUTO: 0.1 K/UL — SIGNIFICANT CHANGE UP (ref 0–0.5)
EOSINOPHIL NFR BLD AUTO: 1.3 % — SIGNIFICANT CHANGE UP (ref 0–6)
GLUCOSE SERPL-MCNC: 120 MG/DL — HIGH (ref 70–99)
GLUCOSE UR QL: NEGATIVE MG/DL — SIGNIFICANT CHANGE UP
HCT VFR BLD CALC: 39.8 % — SIGNIFICANT CHANGE UP (ref 34.5–45)
HGB BLD-MCNC: 12.7 G/DL — SIGNIFICANT CHANGE UP (ref 11.5–15.5)
IMM GRANULOCYTES NFR BLD AUTO: 0.3 % — SIGNIFICANT CHANGE UP (ref 0–0.9)
KETONES UR-MCNC: ABNORMAL MG/DL
LEUKOCYTE ESTERASE UR-ACNC: ABNORMAL
LYMPHOCYTES # BLD AUTO: 2.17 K/UL — SIGNIFICANT CHANGE UP (ref 1–3.3)
LYMPHOCYTES # BLD AUTO: 27.6 % — SIGNIFICANT CHANGE UP (ref 13–44)
MCHC RBC-ENTMCNC: 26.6 PG — LOW (ref 27–34)
MCHC RBC-ENTMCNC: 31.9 G/DL — LOW (ref 32–36)
MCV RBC AUTO: 83.4 FL — SIGNIFICANT CHANGE UP (ref 80–100)
MONOCYTES # BLD AUTO: 0.81 K/UL — SIGNIFICANT CHANGE UP (ref 0–0.9)
MONOCYTES NFR BLD AUTO: 10.3 % — SIGNIFICANT CHANGE UP (ref 2–14)
NEUTROPHILS # BLD AUTO: 4.72 K/UL — SIGNIFICANT CHANGE UP (ref 1.8–7.4)
NEUTROPHILS NFR BLD AUTO: 60 % — SIGNIFICANT CHANGE UP (ref 43–77)
NITRITE UR-MCNC: NEGATIVE — SIGNIFICANT CHANGE UP
NRBC BLD AUTO-RTO: 0 /100 WBCS — SIGNIFICANT CHANGE UP (ref 0–0)
NT-PROBNP SERPL-SCNC: <36 PG/ML — SIGNIFICANT CHANGE UP (ref 0–300)
PH UR: 6.5 — SIGNIFICANT CHANGE UP (ref 5–8)
PLATELET # BLD AUTO: 294 K/UL — SIGNIFICANT CHANGE UP (ref 150–400)
POTASSIUM SERPL-MCNC: 4 MMOL/L — SIGNIFICANT CHANGE UP (ref 3.5–5.3)
POTASSIUM SERPL-SCNC: 4 MMOL/L — SIGNIFICANT CHANGE UP (ref 3.5–5.3)
PROT UR-MCNC: NEGATIVE MG/DL — SIGNIFICANT CHANGE UP
RBC # BLD: 4.77 M/UL — SIGNIFICANT CHANGE UP (ref 3.8–5.2)
RBC # FLD: 14.3 % — SIGNIFICANT CHANGE UP (ref 10.3–14.5)
SODIUM SERPL-SCNC: 139 MMOL/L — SIGNIFICANT CHANGE UP (ref 135–145)
SP GR SPEC: >1.03 — HIGH (ref 1–1.03)
TROPONIN T, HIGH SENSITIVITY RESULT: <6 NG/L — SIGNIFICANT CHANGE UP (ref 0–51)
UROBILINOGEN FLD QL: 1 MG/DL — SIGNIFICANT CHANGE UP (ref 0.2–1)
WBC # BLD: 7.86 K/UL — SIGNIFICANT CHANGE UP (ref 3.8–10.5)
WBC # FLD AUTO: 7.86 K/UL — SIGNIFICANT CHANGE UP (ref 3.8–10.5)

## 2025-04-14 PROCEDURE — 36415 COLL VENOUS BLD VENIPUNCTURE: CPT

## 2025-04-14 PROCEDURE — 71275 CT ANGIOGRAPHY CHEST: CPT | Mod: 26

## 2025-04-14 PROCEDURE — 80048 BASIC METABOLIC PNL TOTAL CA: CPT

## 2025-04-14 PROCEDURE — 71045 X-RAY EXAM CHEST 1 VIEW: CPT

## 2025-04-14 PROCEDURE — 96374 THER/PROPH/DIAG INJ IV PUSH: CPT | Mod: XU

## 2025-04-14 PROCEDURE — 71275 CT ANGIOGRAPHY CHEST: CPT | Mod: MC

## 2025-04-14 PROCEDURE — 99285 EMERGENCY DEPT VISIT HI MDM: CPT

## 2025-04-14 PROCEDURE — 85025 COMPLETE CBC W/AUTO DIFF WBC: CPT

## 2025-04-14 PROCEDURE — 83880 ASSAY OF NATRIURETIC PEPTIDE: CPT

## 2025-04-14 PROCEDURE — 87086 URINE CULTURE/COLONY COUNT: CPT

## 2025-04-14 PROCEDURE — 84484 ASSAY OF TROPONIN QUANT: CPT

## 2025-04-14 PROCEDURE — 81001 URINALYSIS AUTO W/SCOPE: CPT

## 2025-04-14 PROCEDURE — 71045 X-RAY EXAM CHEST 1 VIEW: CPT | Mod: 26

## 2025-04-14 PROCEDURE — 99284 EMERGENCY DEPT VISIT MOD MDM: CPT | Mod: 25

## 2025-04-14 RX ORDER — KETOROLAC TROMETHAMINE 30 MG/ML
15 INJECTION, SOLUTION INTRAMUSCULAR; INTRAVENOUS ONCE
Refills: 0 | Status: DISCONTINUED | OUTPATIENT
Start: 2025-04-14 | End: 2025-04-14

## 2025-04-14 RX ORDER — IBUPROFEN 200 MG
1 TABLET ORAL
Qty: 30 | Refills: 0
Start: 2025-04-14 | End: 2025-04-23

## 2025-04-14 RX ADMIN — KETOROLAC TROMETHAMINE 15 MILLIGRAM(S): 30 INJECTION, SOLUTION INTRAMUSCULAR; INTRAVENOUS at 14:37

## 2025-04-14 NOTE — ED PROVIDER NOTE - PATIENT PORTAL LINK FT
You can access the FollowMyHealth Patient Portal offered by [facility] by registering at the following website: [email or web address]. By joining [facility]’s FollowMyHealth portal, you will also be able to view your health information using other applications (apps) compatible with our system.

## 2025-04-14 NOTE — ED ADULT NURSE NOTE - NSFALLUNIVINTERV_ED_ALL_ED
Bed/Stretcher in lowest position, wheels locked, appropriate side rails in place/Call bell, personal items and telephone in reach/Instruct patient to call for assistance before getting out of bed/chair/stretcher/Non-slip footwear applied when patient is off stretcher/[place] to call system/Physically safe environment - no spills, clutter or unnecessary equipment/Purposeful proactive rounding/Room/bathroom lighting operational, light cord in reach

## 2025-04-14 NOTE — ED ADULT NURSE NOTE - OBJECTIVE STATEMENT
Pt arrived to ED c/o R upper back pain radiating to the R ribs x2 days. Pt also endorses sob with the pain. Pt denies cp, n/v/d, f/. Pt A&Ox4, ambulatory with steady gait, speaking in clear/full sentences, NAD, changed into gown

## 2025-04-14 NOTE — ED PROVIDER NOTE - NSFOLLOWUPINSTRUCTIONS_ED_ALL_ED_FT
Chest Wall Pain  Chest wall pain is pain in or around the bones and muscles of your chest. Chest wall pain may be caused by:  An injury.  Coughing a lot.  Using your chest and arm muscles too much.  Sometimes, the cause may not be known. This pain may take a few weeks or longer to get better.    Follow these instructions at home:  Managing pain, stiffness, and swelling    A bag of ice on a towel on the skin  If told, put ice on the painful area:  Put ice in a plastic bag.  Place a towel between your skin and the bag.  Leave the ice on for 20 minutes, 2–3 times a day.  Activity    Rest as told by your doctor.  Avoid doing things that cause pain. This includes lifting heavy items.  Ask your doctor what activities are safe for you.  General instructions    A do not smoke cigarettes sign.  Take over-the-counter and prescription medicines only as told by your doctor.  Do not use any products that contain nicotine or tobacco, such as cigarettes, e-cigarettes, and chewing tobacco. If you need help quitting, ask your doctor.  Keep all follow-up visits as told by your doctor. This is important.  Contact a doctor if:  You have a fever.  Your chest pain gets worse.  You have new symptoms.  Get help right away if:  You feel sick to your stomach (nauseous) or you throw up (vomit).  You feel sweaty or light-headed.  You have a cough with mucus from your lungs (sputum) or you cough up blood.  You are short of breath.  These symptoms may be an emergency. Do not wait to see if the symptoms will go away. Get medical help right away. Call your local emergency services (911 in the [place] Do not drive yourself to the hospital.    Summary  Chest wall pain is pain in or around the bones and muscles of your chest.  It may be treated with ice, rest, and medicines. Your condition may also get better if you avoid doing things that cause pain.  Contact a doctor if you have a fever, chest pain that gets worse, or new symptoms.  Get help right away if you feel light-headed or you get short of breath. These symptoms may be an emergency.  This information is not intended to replace advice given to you by your health care provider. Make sure you discuss any questions you have with your health care provider.

## 2025-04-14 NOTE — ED PROVIDER NOTE - PROGRESS NOTE DETAILS
Director - pt w nl labs, ct neg for pe, feeling better after toradol.  Pt pending ua result.  Pt signed out to GISELLE Austin will continue to follow. CTA chest neg for PE, pt feeling better after toradol, will dc, ibuprofen PRN pain, f/u pmd, return to ED if sx worsen.

## 2025-04-14 NOTE — ED PROVIDER NOTE - CLINICAL SUMMARY MEDICAL DECISION MAKING FREE TEXT BOX
63 y/o f presents c/o right side back pain radiating to right chest with sob today.  Pt afebrile in ED, tachy at triage and reports she just walked several blocks to ED, repeat HR is 99.  EKG sinus tachy at 114 with no ischemic changes.  Pt with no risk factors for PE but given her pleuritic pain and tachycardia, will get CTA chest r/o PE, labs sent and unremarkable.  Pt given toradol for pain, will f/u CT and reassess.

## 2025-04-14 NOTE — ED PROVIDER NOTE - ATTENDING APP SHARED VISIT CONTRIBUTION OF CARE
65 yo F h/o HTN, HLD, DM  c/o right side back pain radiating to right chest since last pm, worse w deep breath.  No fever, cough, uri sx.  No dysuria, hematuria.  No h/o gallstones, change in sx w eating, n/v/d.  Pt reports trying to move her mattress 2 d ago.  Pt also w recent 1 h plane flight.  No h/o pe/dvt, no le pain/swelling, hrt, tob, recent surgery. No sob.  Well appearing, nad, nc/at, lung cta, heart reg, abd soft, nt, ext no gross deformity, no gross neuro deficits, neck and back nontender midline, no cvat.  Pt c/o R upper back pain radiating to chest - ? msk, ? pe, ? referred from renal source (no uti sx, ? prox stone), ? referred from gi (no ttp ruq on exam, n/v/d to suggest this), no sig concern for atypical cardiac.  EKG w sinus tach.  Plan labs, cta for pe, pain meds; reassess.

## 2025-04-14 NOTE — ED PROVIDER NOTE - OBJECTIVE STATEMENT
63 y/o f hx HTN, HLD, DM presents c/o right side back pain radiating to right chest today.  Pt stating she noticed the sx when she got up last night to go to the bathroom and pain has been constant since.  Pt stating has increased pain when she takes a deep breath, reports her breathing is normal at rest.  Denies fever, chills, cough, n/v, leg swelling, exogenous estrogen, recent travel, recent surgery, fall/trauma, all other ROS negative.

## 2025-04-17 DIAGNOSIS — R06.02 SHORTNESS OF BREATH: ICD-10-CM

## 2025-04-17 DIAGNOSIS — R07.89 OTHER CHEST PAIN: ICD-10-CM

## 2025-04-17 DIAGNOSIS — E78.5 HYPERLIPIDEMIA, UNSPECIFIED: ICD-10-CM

## 2025-04-17 DIAGNOSIS — E11.9 TYPE 2 DIABETES MELLITUS WITHOUT COMPLICATIONS: ICD-10-CM

## 2025-04-17 DIAGNOSIS — I10 ESSENTIAL (PRIMARY) HYPERTENSION: ICD-10-CM

## 2025-05-17 ENCOUNTER — EMERGENCY (EMERGENCY)
Facility: HOSPITAL | Age: 65
LOS: 1 days | End: 2025-05-17
Attending: EMERGENCY MEDICINE | Admitting: EMERGENCY MEDICINE
Payer: MEDICAID

## 2025-05-17 VITALS
OXYGEN SATURATION: 94 % | RESPIRATION RATE: 17 BRPM | HEART RATE: 112 BPM | SYSTOLIC BLOOD PRESSURE: 138 MMHG | TEMPERATURE: 98 F | DIASTOLIC BLOOD PRESSURE: 75 MMHG

## 2025-05-17 VITALS
DIASTOLIC BLOOD PRESSURE: 84 MMHG | RESPIRATION RATE: 18 BRPM | WEIGHT: 166.89 LBS | HEART RATE: 112 BPM | HEIGHT: 59 IN | TEMPERATURE: 99 F | OXYGEN SATURATION: 92 % | SYSTOLIC BLOOD PRESSURE: 135 MMHG

## 2025-05-17 LAB
ANION GAP SERPL CALC-SCNC: 13 MMOL/L — SIGNIFICANT CHANGE UP (ref 5–17)
BASOPHILS # BLD AUTO: 0.06 K/UL — SIGNIFICANT CHANGE UP (ref 0–0.2)
BASOPHILS NFR BLD AUTO: 0.8 % — SIGNIFICANT CHANGE UP (ref 0–2)
BUN SERPL-MCNC: 8 MG/DL — SIGNIFICANT CHANGE UP (ref 7–23)
CALCIUM SERPL-MCNC: 9.4 MG/DL — SIGNIFICANT CHANGE UP (ref 8.4–10.5)
CHLORIDE SERPL-SCNC: 99 MMOL/L — SIGNIFICANT CHANGE UP (ref 96–108)
CO2 SERPL-SCNC: 27 MMOL/L — SIGNIFICANT CHANGE UP (ref 22–31)
CREAT SERPL-MCNC: 0.71 MG/DL — SIGNIFICANT CHANGE UP (ref 0.5–1.3)
EGFR: 96 ML/MIN/1.73M2 — SIGNIFICANT CHANGE UP
EGFR: 96 ML/MIN/1.73M2 — SIGNIFICANT CHANGE UP
EOSINOPHIL # BLD AUTO: 0.1 K/UL — SIGNIFICANT CHANGE UP (ref 0–0.5)
EOSINOPHIL NFR BLD AUTO: 1.3 % — SIGNIFICANT CHANGE UP (ref 0–6)
FLUAV AG NPH QL: SIGNIFICANT CHANGE UP
FLUBV AG NPH QL: SIGNIFICANT CHANGE UP
GLUCOSE SERPL-MCNC: 108 MG/DL — HIGH (ref 70–99)
HCT VFR BLD CALC: 38.3 % — SIGNIFICANT CHANGE UP (ref 34.5–45)
HGB BLD-MCNC: 12.4 G/DL — SIGNIFICANT CHANGE UP (ref 11.5–15.5)
IMM GRANULOCYTES NFR BLD AUTO: 0.3 % — SIGNIFICANT CHANGE UP (ref 0–0.9)
LYMPHOCYTES # BLD AUTO: 1.34 K/UL — SIGNIFICANT CHANGE UP (ref 1–3.3)
LYMPHOCYTES # BLD AUTO: 17.5 % — SIGNIFICANT CHANGE UP (ref 13–44)
MCHC RBC-ENTMCNC: 27.3 PG — SIGNIFICANT CHANGE UP (ref 27–34)
MCHC RBC-ENTMCNC: 32.4 G/DL — SIGNIFICANT CHANGE UP (ref 32–36)
MCV RBC AUTO: 84.4 FL — SIGNIFICANT CHANGE UP (ref 80–100)
MONOCYTES # BLD AUTO: 0.91 K/UL — HIGH (ref 0–0.9)
MONOCYTES NFR BLD AUTO: 11.9 % — SIGNIFICANT CHANGE UP (ref 2–14)
NEUTROPHILS # BLD AUTO: 5.24 K/UL — SIGNIFICANT CHANGE UP (ref 1.8–7.4)
NEUTROPHILS NFR BLD AUTO: 68.2 % — SIGNIFICANT CHANGE UP (ref 43–77)
NRBC BLD AUTO-RTO: 0 /100 WBCS — SIGNIFICANT CHANGE UP (ref 0–0)
PLATELET # BLD AUTO: 250 K/UL — SIGNIFICANT CHANGE UP (ref 150–400)
POTASSIUM SERPL-MCNC: 4.1 MMOL/L — SIGNIFICANT CHANGE UP (ref 3.5–5.3)
POTASSIUM SERPL-SCNC: 4.1 MMOL/L — SIGNIFICANT CHANGE UP (ref 3.5–5.3)
RBC # BLD: 4.54 M/UL — SIGNIFICANT CHANGE UP (ref 3.8–5.2)
RBC # FLD: 14.6 % — HIGH (ref 10.3–14.5)
RSV RNA NPH QL NAA+NON-PROBE: SIGNIFICANT CHANGE UP
SARS-COV-2 RNA SPEC QL NAA+PROBE: SIGNIFICANT CHANGE UP
SODIUM SERPL-SCNC: 139 MMOL/L — SIGNIFICANT CHANGE UP (ref 135–145)
SOURCE RESPIRATORY: SIGNIFICANT CHANGE UP
WBC # BLD: 7.67 K/UL — SIGNIFICANT CHANGE UP (ref 3.8–10.5)
WBC # FLD AUTO: 7.67 K/UL — SIGNIFICANT CHANGE UP (ref 3.8–10.5)

## 2025-05-17 PROCEDURE — 96374 THER/PROPH/DIAG INJ IV PUSH: CPT

## 2025-05-17 PROCEDURE — 96375 TX/PRO/DX INJ NEW DRUG ADDON: CPT

## 2025-05-17 PROCEDURE — 71045 X-RAY EXAM CHEST 1 VIEW: CPT

## 2025-05-17 PROCEDURE — 36415 COLL VENOUS BLD VENIPUNCTURE: CPT

## 2025-05-17 PROCEDURE — 94640 AIRWAY INHALATION TREATMENT: CPT

## 2025-05-17 PROCEDURE — 71045 X-RAY EXAM CHEST 1 VIEW: CPT | Mod: 26

## 2025-05-17 PROCEDURE — 87637 SARSCOV2&INF A&B&RSV AMP PRB: CPT

## 2025-05-17 PROCEDURE — 99284 EMERGENCY DEPT VISIT MOD MDM: CPT

## 2025-05-17 PROCEDURE — 80048 BASIC METABOLIC PNL TOTAL CA: CPT

## 2025-05-17 PROCEDURE — 85025 COMPLETE CBC W/AUTO DIFF WBC: CPT

## 2025-05-17 PROCEDURE — 99285 EMERGENCY DEPT VISIT HI MDM: CPT | Mod: 25

## 2025-05-17 RX ORDER — ACETAMINOPHEN 500 MG/5ML
1000 LIQUID (ML) ORAL ONCE
Refills: 0 | Status: COMPLETED | OUTPATIENT
Start: 2025-05-17 | End: 2025-05-17

## 2025-05-17 RX ORDER — UMECLIDINIUM BROMIDE AND VILANTEROL TRIFENATATE 62.5; 25 UG/1; UG/1
1 POWDER RESPIRATORY (INHALATION)
Qty: 1 | Refills: 0
Start: 2025-05-17 | End: 2025-06-15

## 2025-05-17 RX ORDER — PREDNISONE 20 MG/1
1 TABLET ORAL
Qty: 10 | Refills: 0
Start: 2025-05-17 | End: 2025-05-21

## 2025-05-17 RX ORDER — METHYLPREDNISOLONE ACETATE 80 MG/ML
125 INJECTION, SUSPENSION INTRA-ARTICULAR; INTRALESIONAL; INTRAMUSCULAR; SOFT TISSUE ONCE
Refills: 0 | Status: COMPLETED | OUTPATIENT
Start: 2025-05-17 | End: 2025-05-17

## 2025-05-17 RX ORDER — IPRATROPIUM BROMIDE AND ALBUTEROL SULFATE .5; 2.5 MG/3ML; MG/3ML
3 SOLUTION RESPIRATORY (INHALATION)
Refills: 0 | Status: COMPLETED | OUTPATIENT
Start: 2025-05-17 | End: 2025-05-17

## 2025-05-17 RX ADMIN — IPRATROPIUM BROMIDE AND ALBUTEROL SULFATE 3 MILLILITER(S): .5; 2.5 SOLUTION RESPIRATORY (INHALATION) at 14:51

## 2025-05-17 RX ADMIN — Medication 400 MILLIGRAM(S): at 14:23

## 2025-05-17 RX ADMIN — Medication 1000 MILLILITER(S): at 14:19

## 2025-05-17 RX ADMIN — IPRATROPIUM BROMIDE AND ALBUTEROL SULFATE 3 MILLILITER(S): .5; 2.5 SOLUTION RESPIRATORY (INHALATION) at 14:23

## 2025-05-17 RX ADMIN — METHYLPREDNISOLONE ACETATE 125 MILLIGRAM(S): 80 INJECTION, SUSPENSION INTRA-ARTICULAR; INTRALESIONAL; INTRAMUSCULAR; SOFT TISSUE at 14:24

## 2025-05-17 RX ADMIN — IPRATROPIUM BROMIDE AND ALBUTEROL SULFATE 3 MILLILITER(S): .5; 2.5 SOLUTION RESPIRATORY (INHALATION) at 14:38

## 2025-05-17 NOTE — ED ADULT NURSE NOTE - NSFALLUNIVINTERV_ED_ALL_ED
Bed/Stretcher in lowest position, wheels locked, appropriate side rails in place/Call bell, personal items and telephone in reach/Instruct patient to call for assistance before getting out of bed/chair/stretcher/Non-slip footwear applied when patient is off stretcher/[place] to call system/Physically safe environment - no spills, clutter or unnecessary equipment/Purposeful proactive rounding/Room/bathroom lighting operational, light cord in reach

## 2025-05-17 NOTE — ED PROVIDER NOTE - OBJECTIVE STATEMENT
65 y/o f hx asthma presents c/o cough, nasal congestion x 2 days with intermittent wheezing.  Pt stating she has been using her inhaler with some improvement but still having symptoms.  Denies fever, chills, all other ROS negative.

## 2025-05-17 NOTE — ED PROVIDER NOTE - ATTENDING APP SHARED VISIT CONTRIBUTION OF CARE
63 y/o f hx asthma presents c/o cough, nasal congestion x 2 days with intermittent wheezing.  Pt stating she has been using her inhaler with some improvement but still having symptoms.  Denies fever, chills, all other ROS negative.     Pt afebrile in ED, no resp distress, mild wheezing on exam.  Labs unremarkable, CXR neg, wheezing improved with nebs and steroids.  Pt feeling better, stable for d/c, continue albuterol inhaler, given rx prednisone, f/u pmd, return to ED if sx worsen.    Addendum to attending statement: I have reviewed the ACP note and agree with the history, exam, and plan of care. I  was available to PA   as a supervising provider if needed. PA given opportunity to ask questions and request further evaluation / care.    PT well appearing in ED with URI symptoms and mild wheeze   Feeling better with nebs and steroids in ED  Most likely reactive airway exacerbated by URI  Pt understands return precautions and follow up

## 2025-05-17 NOTE — ED PROVIDER NOTE - CLINICAL SUMMARY MEDICAL DECISION MAKING FREE TEXT BOX
63 y/o f hx asthma presents c/o cough, nasal congestion x 2 days with wheezing.  Pt afebrile in ED, no resp distress, mild wheezing on exam.  Labs unremarkable, CXR neg, wheezing improved with nebs and steroids.  Pt feeling better, stable for d/c, continue albuterol inhaler, given rx prednisone, f/u pmd, return to ED if sx worsen.

## 2025-05-17 NOTE — ED PROVIDER NOTE - PATIENT PORTAL LINK FT
You can access the FollowMyHealth Patient Portal offered by [facility] by registering at the following website: [email or web address]. By joining [employer]’s FollowMyHealth portal, you will also be able to view your health information using other applications (apps) compatible with our system.

## 2025-05-20 DIAGNOSIS — J06.9 ACUTE UPPER RESPIRATORY INFECTION, UNSPECIFIED: ICD-10-CM

## 2025-05-20 DIAGNOSIS — F17.200 NICOTINE DEPENDENCE, UNSPECIFIED, UNCOMPLICATED: ICD-10-CM

## 2025-05-20 DIAGNOSIS — R05.1 ACUTE COUGH: ICD-10-CM

## 2025-05-20 DIAGNOSIS — J45.909 UNSPECIFIED ASTHMA, UNCOMPLICATED: ICD-10-CM

## 2025-05-29 ENCOUNTER — APPOINTMENT (OUTPATIENT)
Dept: PULMONOLOGY | Facility: CLINIC | Age: 65
End: 2025-05-29
Payer: MEDICAID

## 2025-05-29 VITALS
HEART RATE: 126 BPM | SYSTOLIC BLOOD PRESSURE: 116 MMHG | TEMPERATURE: 97.9 F | WEIGHT: 160 LBS | HEIGHT: 59 IN | BODY MASS INDEX: 32.25 KG/M2 | OXYGEN SATURATION: 95 % | DIASTOLIC BLOOD PRESSURE: 84 MMHG

## 2025-05-29 DIAGNOSIS — J44.9 CHRONIC OBSTRUCTIVE PULMONARY DISEASE, UNSPECIFIED: ICD-10-CM

## 2025-05-29 DIAGNOSIS — F17.200 NICOTINE DEPENDENCE, UNSPECIFIED, UNCOMPLICATED: ICD-10-CM

## 2025-05-29 PROCEDURE — G2211 COMPLEX E/M VISIT ADD ON: CPT | Mod: NC

## 2025-05-29 PROCEDURE — 99214 OFFICE O/P EST MOD 30 MIN: CPT

## 2025-05-30 DIAGNOSIS — R00.0 TACHYCARDIA, UNSPECIFIED: ICD-10-CM

## 2025-06-18 ENCOUNTER — APPOINTMENT (OUTPATIENT)
Dept: SLEEP CENTER | Facility: HOME HEALTH | Age: 65
End: 2025-06-18
Payer: MEDICAID

## 2025-06-18 PROCEDURE — 95800 SLP STDY UNATTENDED: CPT | Mod: 26

## 2025-06-19 ENCOUNTER — APPOINTMENT (OUTPATIENT)
Dept: HEART AND VASCULAR | Facility: CLINIC | Age: 65
End: 2025-06-19
Payer: MEDICAID

## 2025-06-19 VITALS
WEIGHT: 162 LBS | HEART RATE: 98 BPM | TEMPERATURE: 98 F | HEIGHT: 59 IN | DIASTOLIC BLOOD PRESSURE: 86 MMHG | SYSTOLIC BLOOD PRESSURE: 143 MMHG | OXYGEN SATURATION: 94 % | BODY MASS INDEX: 32.66 KG/M2

## 2025-06-19 VITALS — DIASTOLIC BLOOD PRESSURE: 90 MMHG | SYSTOLIC BLOOD PRESSURE: 140 MMHG

## 2025-06-19 PROCEDURE — 93306 TTE W/DOPPLER COMPLETE: CPT

## 2025-06-19 PROCEDURE — 93000 ELECTROCARDIOGRAM COMPLETE: CPT

## 2025-06-19 PROCEDURE — 99214 OFFICE O/P EST MOD 30 MIN: CPT | Mod: 25
